# Patient Record
Sex: FEMALE | Race: WHITE | Employment: FULL TIME | URBAN - METROPOLITAN AREA
[De-identification: names, ages, dates, MRNs, and addresses within clinical notes are randomized per-mention and may not be internally consistent; named-entity substitution may affect disease eponyms.]

---

## 2019-06-15 ENCOUNTER — HOSPITAL ENCOUNTER (EMERGENCY)
Age: 49
Discharge: HOME | End: 2019-06-15
Attending: EMERGENCY MEDICINE
Payer: COMMERCIAL

## 2019-06-15 VITALS
SYSTOLIC BLOOD PRESSURE: 157 MMHG | RESPIRATION RATE: 16 BRPM | OXYGEN SATURATION: 96 % | HEIGHT: 69 IN | HEART RATE: 87 BPM | TEMPERATURE: 98.3 F | WEIGHT: 250 LBS | BODY MASS INDEX: 37.03 KG/M2 | DIASTOLIC BLOOD PRESSURE: 96 MMHG

## 2019-06-15 DIAGNOSIS — R53.1 GENERAL WEAKNESS: Primary | ICD-10-CM

## 2019-06-15 DIAGNOSIS — D75.1 POLYCYTHEMIA: ICD-10-CM

## 2019-06-15 DIAGNOSIS — N93.9 VAGINAL BLEEDING: ICD-10-CM

## 2019-06-15 LAB
ANION GAP SERPL CALCULATED.3IONS-SCNC: 12 MMOL/L (ref 7–16)
BACTERIA: ABNORMAL /HPF
BASOPHILS ABSOLUTE: 0.07 E9/L (ref 0–0.2)
BASOPHILS RELATIVE PERCENT: 1 % (ref 0–2)
BILIRUBIN URINE: NEGATIVE
BLOOD, URINE: ABNORMAL
BUN BLDV-MCNC: 14 MG/DL (ref 6–20)
CALCIUM SERPL-MCNC: 9.4 MG/DL (ref 8.6–10.2)
CHLORIDE BLD-SCNC: 99 MMOL/L (ref 98–107)
CLARITY: ABNORMAL
CO2: 25 MMOL/L (ref 22–29)
COLOR: ABNORMAL
CREAT SERPL-MCNC: 0.8 MG/DL (ref 0.5–1)
EKG ATRIAL RATE: 81 BPM
EKG P AXIS: 44 DEGREES
EKG P-R INTERVAL: 158 MS
EKG Q-T INTERVAL: 376 MS
EKG QRS DURATION: 102 MS
EKG QTC CALCULATION (BAZETT): 436 MS
EKG R AXIS: 61 DEGREES
EKG T AXIS: 36 DEGREES
EKG VENTRICULAR RATE: 81 BPM
EOSINOPHILS ABSOLUTE: 0.06 E9/L (ref 0.05–0.5)
EOSINOPHILS RELATIVE PERCENT: 0.9 % (ref 0–6)
EPITHELIAL CELLS, UA: ABNORMAL /HPF
GFR AFRICAN AMERICAN: >60
GFR NON-AFRICAN AMERICAN: >60 ML/MIN/1.73
GLUCOSE BLD-MCNC: 118 MG/DL (ref 74–99)
GLUCOSE URINE: NEGATIVE MG/DL
HCT VFR BLD CALC: 57.7 % (ref 34–48)
HEMOGLOBIN: 19.4 G/DL (ref 11.5–15.5)
IMMATURE GRANULOCYTES #: 0.02 E9/L
IMMATURE GRANULOCYTES %: 0.3 % (ref 0–5)
KETONES, URINE: NEGATIVE MG/DL
LEUKOCYTE ESTERASE, URINE: ABNORMAL
LYMPHOCYTES ABSOLUTE: 2.02 E9/L (ref 1.5–4)
LYMPHOCYTES RELATIVE PERCENT: 30.1 % (ref 20–42)
MCH RBC QN AUTO: 29.3 PG (ref 26–35)
MCHC RBC AUTO-ENTMCNC: 33.6 % (ref 32–34.5)
MCV RBC AUTO: 87.2 FL (ref 80–99.9)
MONOCYTES ABSOLUTE: 0.42 E9/L (ref 0.1–0.95)
MONOCYTES RELATIVE PERCENT: 6.3 % (ref 2–12)
NEUTROPHILS ABSOLUTE: 4.13 E9/L (ref 1.8–7.3)
NEUTROPHILS RELATIVE PERCENT: 61.4 % (ref 43–80)
NITRITE, URINE: NEGATIVE
PDW BLD-RTO: 13.3 FL (ref 11.5–15)
PH UA: 6 (ref 5–9)
PLATELET # BLD: 202 E9/L (ref 130–450)
PMV BLD AUTO: 10.3 FL (ref 7–12)
POTASSIUM REFLEX MAGNESIUM: 4.2 MMOL/L (ref 3.5–5)
PROTEIN UA: ABNORMAL MG/DL
RBC # BLD: 6.62 E12/L (ref 3.5–5.5)
RBC UA: >20 /HPF (ref 0–2)
SODIUM BLD-SCNC: 136 MMOL/L (ref 132–146)
SPECIFIC GRAVITY UA: 1.01 (ref 1–1.03)
TROPONIN: <0.01 NG/ML (ref 0–0.03)
UROBILINOGEN, URINE: 0.2 E.U./DL
WBC # BLD: 6.7 E9/L (ref 4.5–11.5)
WBC UA: ABNORMAL /HPF (ref 0–5)

## 2019-06-15 PROCEDURE — 81001 URINALYSIS AUTO W/SCOPE: CPT

## 2019-06-15 PROCEDURE — 93010 ELECTROCARDIOGRAM REPORT: CPT | Performed by: INTERNAL MEDICINE

## 2019-06-15 PROCEDURE — 84484 ASSAY OF TROPONIN QUANT: CPT

## 2019-06-15 PROCEDURE — 80048 BASIC METABOLIC PNL TOTAL CA: CPT

## 2019-06-15 PROCEDURE — 2580000003 HC RX 258: Performed by: STUDENT IN AN ORGANIZED HEALTH CARE EDUCATION/TRAINING PROGRAM

## 2019-06-15 PROCEDURE — 93005 ELECTROCARDIOGRAM TRACING: CPT | Performed by: STUDENT IN AN ORGANIZED HEALTH CARE EDUCATION/TRAINING PROGRAM

## 2019-06-15 PROCEDURE — 85025 COMPLETE CBC W/AUTO DIFF WBC: CPT

## 2019-06-15 PROCEDURE — 99283 EMERGENCY DEPT VISIT LOW MDM: CPT

## 2019-06-15 RX ORDER — DULOXETIN HYDROCHLORIDE 60 MG/1
60 CAPSULE, DELAYED RELEASE ORAL 2 TIMES DAILY
COMMUNITY

## 2019-06-15 RX ORDER — 0.9 % SODIUM CHLORIDE 0.9 %
1000 INTRAVENOUS SOLUTION INTRAVENOUS ONCE
Status: COMPLETED | OUTPATIENT
Start: 2019-06-15 | End: 2019-06-15

## 2019-06-15 RX ADMIN — SODIUM CHLORIDE 1000 ML: 9 INJECTION, SOLUTION INTRAVENOUS at 02:30

## 2019-06-15 SDOH — HEALTH STABILITY: MENTAL HEALTH: HOW OFTEN DO YOU HAVE A DRINK CONTAINING ALCOHOL?: NEVER

## 2019-06-15 ASSESSMENT — ENCOUNTER SYMPTOMS
SHORTNESS OF BREATH: 0
TROUBLE SWALLOWING: 0
VISUAL CHANGE: 1
ABDOMINAL PAIN: 0
NAUSEA: 0
BACK PAIN: 0
DIARRHEA: 0
COUGH: 0
VOMITING: 0

## 2019-06-15 NOTE — ED PROVIDER NOTES
ED ATTENDING NOTE    I saw and examined the patient. I discussed the history and examination with the resident and agree with the plan of care         HPI: Pt presents with heavy vaginal bleeding, x several days, had near syncope today, light headed, pt deneis f/c, ha, cp, sob, cough, ap, n/v/d. Pt is lying in bed in no acute distress. --------------------------------------------- PAST HISTORY ---------------------------------------------  Past Medical History:  has a past medical history of Depression. Past Surgical History:  has a past surgical history that includes Umbilical hernia repair. Social History:  reports that she has been smoking. She has been smoking about 0.50 packs per day. She has never used smokeless tobacco. She reports that she does not drink alcohol or use drugs. Family History: family history is not on file. The patients home medications have been reviewed. Allergies: Patient has no known allergies.     ROS: Review HPI for other details  Details in electronic record may supersede details below    Gen: no chills, fever  Eyes: no discharge, no change vision  ENT: no sore throat, no rhinitis  Cardiac: no chest pain, no palpitations  Resp: no sob, no cough  GI: no abd pain, no nausea, vomiting, or diarrhea  : no dysuria, urgency, change in color  MS: no back pain, joint pain, no falls, no trauma   Skin: no rash, no itch  Neuro: (+) dizziness, no headache, no focal paralysis or parasthesia  Psych: no hallucinations, no depression  Heme: no bruising, no bleeding  Other relevant systems reviewed and negative    Physical brief exam: See HPI for other details  Details in electronic record may supersede details below    Vital signs reviewed, please refer to nurses note  Constitutionall: No distress, warm, dry, well nourished, nontoxic  HENT:  NC AT, no deformity, no bleeding of gums  Eyes:  EOMI, nl lids and conjunctiva   Resp:  normal resp rate, no resp distress, no stridor  CVS: no JVD, no visible heave  GI:  no outward sign peritonitis or splinting, non distended  Musc-Skel:  full range of motion, normal appearing, no deformities/edema/ ecchymosis  Skin:  warm and dry, normal turgor, no rashes   Neurologic: awake and alert, cooperative, Cranial Nerves II-XII grossly intact, motor& sensory grossly intact x4   Psychiatric: orientated x3, answers questions appropriately, judgment & affect grossly appropriate  Heme/ Lymph: no visible adenopathy, ecchymosis, pettichiae    ------------------------ NURSING NOTES AND VITALS REVIEWED ---------------------------  Date / Time Roomed:  6/15/2019  1:13 AM  ED Bed Assignment:  22/22    The nursing notes within the ED encounter and vital signs as below have been reviewed. Patient Vitals for the past 24 hrs:   BP Temp Pulse Resp SpO2 Height Weight   06/15/19 0059 (!) 157/96 98.3 °F (36.8 °C) 87 20 96 % 5' 9.25\" (1.759 m) 250 lb (113.4 kg)       Oxygen Saturation Interpretation: Normal      Assessment and Plan: workup negative, pt is feeling better, pt to dc home and f/u with pcp, given strict return precautions for any new or worsening symptoms      Impression:   1. General weakness    2. Polycythemia    3. Vaginal bleeding            I have reviewed the patient's medications, vital signs, and diagnostic studies available to me in the medical record at the time of the patient encounter.         Orlando Gonzalez MD  06/29/19 2384

## 2019-06-15 NOTE — ED PROVIDER NOTES
The patient is a 38-year-old female who presents to the emergency department complaining of fatigue. The patient states she has had ongoing fatigue and weakness over the past 4 to 5 months. The patient states that today when she was at work she felt dizzy and lightheaded. Patient states that her vision became blurry, and she felt like she was going to pass out. The patient states she also has had increased vaginal bleeding with her menstrual periods over the past 3 months. Patient states she is currently on day 5 of her current menstrual cycle. The patient has not taken anything at home for her symptoms. Patient has not followed up with OB/GYN or family doctor in many years. Patient denies any fever, chills, cough, congestion, syncope, headache, neck pain, chest pain, shortness of breath, abdominal pain, nausea, vomiting, diarrhea, dysuria, hematuria, recent hospitalization, recent illness, change in medications, history of blood clots, or other acute symptoms or concerns. The history is provided by the patient. Fatigue   Severity:  Moderate  Onset quality:  Gradual  Timing:  Constant  Progression:  Worsening  Chronicity:  New  Context: not recent infection    Relieved by:  None tried  Worsened by:  Nothing  Ineffective treatments:  None tried  Associated symptoms: dizziness, near-syncope and vision change    Associated symptoms: no abdominal pain, no chest pain, no cough, no diarrhea, no difficulty walking, no dysuria, no numbness in extremities, no falls, no fever, no frequency, no headaches, no lethargy, no loss of consciousness, no nausea, no sensory-motor deficit, no shortness of breath, no syncope and no vomiting    Risk factors: excessive menstruation    Risk factors: no new medications and no recent stressors        Review of Systems   Constitutional: Positive for fatigue. Negative for chills and fever. HENT: Negative for congestion and trouble swallowing.     Respiratory: Negative for cough and shortness of breath. Cardiovascular: Positive for near-syncope. Negative for chest pain, palpitations, leg swelling and syncope. Gastrointestinal: Negative for abdominal pain, diarrhea, nausea and vomiting. Genitourinary: Positive for vaginal bleeding. Negative for dysuria, flank pain, frequency and hematuria. Musculoskeletal: Negative for back pain and falls. Skin: Negative for rash and wound. Neurological: Positive for dizziness and light-headedness. Negative for loss of consciousness, syncope, numbness and headaches. All other systems reviewed and are negative. Physical Exam   Constitutional: She is oriented to person, place, and time. Vital signs are normal. She appears well-developed and well-nourished. Non-toxic appearance. She does not have a sickly appearance. She does not appear ill. No distress. HENT:   Head: Normocephalic and atraumatic. Mouth/Throat: Mucous membranes are normal. Mucous membranes are not dry. Eyes: Conjunctivae, EOM and lids are normal.   Neck: Normal range of motion. Neck supple. Cardiovascular: Normal rate, regular rhythm, S1 normal, S2 normal and normal heart sounds. No murmur heard. Pulmonary/Chest: Effort normal and breath sounds normal. No respiratory distress. She has no decreased breath sounds. She has no wheezes. She has no rhonchi. She has no rales. Abdominal: Soft. Bowel sounds are normal. She exhibits no distension. There is no tenderness. There is no rigidity, no rebound and no guarding. Musculoskeletal: She exhibits no edema. Neurological: She is alert and oriented to person, place, and time. She displays no tremor. She exhibits normal muscle tone. Coordination normal.   Skin: Skin is warm and dry. Nursing note and vitals reviewed. Procedures    MDM  Number of Diagnoses or Management Options  General weakness:   Polycythemia:   Vaginal bleeding:   Diagnosis management comments:  The patient is a 49-year-old female presents to the emergency department complaining of fatigue and increased vaginal bleeding. Patient is hemodynamically stable, nontoxic-appearing, and in no acute distress. Labs evident of polycythemia with a hemoglobin of 19.4, hematocrit of 57.7, red blood cells of 6.62. No electrolyte abnormalities, no ST elevation evident on EKG, troponin negative, no evidence of urinary tract infection. Treated the patient with 1 L of IV fluids. Offered patient vaginal examination, however the patient declined and stated that she would rather just follow-up with OB/GYN. Provided patient with referral to OB/GYN, hematologist, and family doctor. Recommended patient to follow-up with his consultants this week. Also recommend her to stay hydrated and take a baby aspirin daily. The patient is to return to the ED if she experienced any significant worsening symptoms. The patient verbalized understanding and agreement to treatment plan and discharge instructions. EKG Interpretation. EKG: This EKG is signed and interpreted by me. Rate: 81  Rhythm: Sinus  Interpretation: Sinus rhythm, normal axis, incomplete right bundle branch block, no ST elevation, T wave flattening in lead III  Comparison: no previous EKG available            --------------------------------------------- PAST HISTORY ---------------------------------------------  Past Medical History:  has a past medical history of Depression. Past Surgical History:  has a past surgical history that includes Umbilical hernia repair. Social History:  reports that she has been smoking. She has been smoking about 0.50 packs per day. She has never used smokeless tobacco. She reports that she does not drink alcohol or use drugs. Family History: family history is not on file. The patients home medications have been reviewed. Allergies: Patient has no known allergies.     -------------------------------------------------- RESULTS -------------------------------------------------  Labs:  Results for orders placed or performed during the hospital encounter of 06/15/19   CBC auto differential   Result Value Ref Range    WBC 6.7 4.5 - 11.5 E9/L    RBC 6.62 (H) 3.50 - 5.50 E12/L    Hemoglobin 19.4 (H) 11.5 - 15.5 g/dL    Hematocrit 57.7 (H) 34.0 - 48.0 %    MCV 87.2 80.0 - 99.9 fL    MCH 29.3 26.0 - 35.0 pg    MCHC 33.6 32.0 - 34.5 %    RDW 13.3 11.5 - 15.0 fL    Platelets 935 075 - 271 E9/L    MPV 10.3 7.0 - 12.0 fL    Neutrophils % 61.4 43.0 - 80.0 %    Immature Granulocytes % 0.3 0.0 - 5.0 %    Lymphocytes % 30.1 20.0 - 42.0 %    Monocytes % 6.3 2.0 - 12.0 %    Eosinophils % 0.9 0.0 - 6.0 %    Basophils % 1.0 0.0 - 2.0 %    Neutrophils # 4.13 1.80 - 7.30 E9/L    Immature Granulocytes # 0.02 E9/L    Lymphocytes # 2.02 1.50 - 4.00 E9/L    Monocytes # 0.42 0.10 - 0.95 E9/L    Eosinophils # 0.06 0.05 - 0.50 E9/L    Basophils # 0.07 0.00 - 0.20 P4/Z   Basic Metabolic Panel w/ Reflex to MG   Result Value Ref Range    Sodium 136 132 - 146 mmol/L    Potassium reflex Magnesium 4.2 3.5 - 5.0 mmol/L    Chloride 99 98 - 107 mmol/L    CO2 25 22 - 29 mmol/L    Anion Gap 12 7 - 16 mmol/L    Glucose 118 (H) 74 - 99 mg/dL    BUN 14 6 - 20 mg/dL    CREATININE 0.8 0.5 - 1.0 mg/dL    GFR Non-African American >60 >=60 mL/min/1.73    GFR African American >60     Calcium 9.4 8.6 - 10.2 mg/dL   Troponin   Result Value Ref Range    Troponin <0.01 0.00 - 0.03 ng/mL   Urinalysis with Microscopic   Result Value Ref Range    Color, UA BLOODY Straw/Yellow    Clarity, UA SL CLOUDY Clear    Glucose, Ur Negative Negative mg/dL    Bilirubin Urine Negative Negative    Ketones, Urine Negative Negative mg/dL    Specific Gravity, UA 1.010 1.005 - 1.030    Blood, Urine LARGE (A) Negative    pH, UA 6.0 5.0 - 9.0    Protein, UA TRACE Negative mg/dL    Urobilinogen, Urine 0.2 <2.0 E.U./dL    Nitrite, Urine Negative Negative    Leukocyte Esterase, Urine TRACE (A) Negative    WBC, UA 2-5 0 - 5 /HPF    RBC, UA >20 0 - 2 /HPF    Epi Cells MODERATE /HPF    Bacteria, UA NONE /HPF   EKG 12 Lead   Result Value Ref Range    Ventricular Rate 81 BPM    Atrial Rate 81 BPM    P-R Interval 158 ms    QRS Duration 102 ms    Q-T Interval 376 ms    QTc Calculation (Bazett) 436 ms    P Axis 44 degrees    R Axis 61 degrees    T Axis 36 degrees       Radiology:  No orders to display       ------------------------- NURSING NOTES AND VITALS REVIEWED ---------------------------  Date / Time Roomed:  6/15/2019  1:13 AM  ED Bed Assignment:  22/22    The nursing notes within the ED encounter and vital signs as below have been reviewed. BP (!) 157/96   Pulse 87   Temp 98.3 °F (36.8 °C)   Resp 16   Ht 5' 9.25\" (1.759 m)   Wt 250 lb (113.4 kg)   LMP 06/12/2019   SpO2 96%   BMI 36.65 kg/m²   Oxygen Saturation Interpretation: Normal      ------------------------------------------ PROGRESS NOTES ------------------------------------------  4:02 AM  I have spoken with the patient and discussed todays results, in addition to providing specific details for the plan of care and counseling regarding the diagnosis and prognosis. Their questions are answered at this time and they are agreeable with the plan. I discussed at length with them reasons for immediate return here for re evaluation. They will followup with their primary care physician by calling their office on Monday.      --------------------------------- ADDITIONAL PROVIDER NOTES ---------------------------------  At this time the patient is without objective evidence of an acute process requiring hospitalization or inpatient management. They have remained hemodynamically stable throughout their entire ED visit and are stable for discharge with outpatient follow-up. The plan has been discussed in detail and they are aware of the specific conditions for emergent return, as well as the importance of follow-up.       New Prescriptions    No medications on file       Diagnosis:  1. General weakness    2. Polycythemia    3. Vaginal bleeding        Disposition:  Patient's disposition: Discharge to home  Patient's condition is stable.            Freida Severe, DO  Resident  06/15/19 2255

## 2021-06-14 ENCOUNTER — OFFICE VISIT (OUTPATIENT)
Dept: PRIMARY CARE CLINIC | Age: 51
End: 2021-06-14
Payer: COMMERCIAL

## 2021-06-14 VITALS
OXYGEN SATURATION: 97 % | RESPIRATION RATE: 14 BRPM | TEMPERATURE: 97.8 F | HEIGHT: 69 IN | DIASTOLIC BLOOD PRESSURE: 80 MMHG | BODY MASS INDEX: 39.99 KG/M2 | SYSTOLIC BLOOD PRESSURE: 142 MMHG | HEART RATE: 115 BPM | WEIGHT: 270 LBS

## 2021-06-14 DIAGNOSIS — D75.1 POLYCYTHEMIA: ICD-10-CM

## 2021-06-14 DIAGNOSIS — R03.0 ELEVATED BLOOD PRESSURE READING: Primary | ICD-10-CM

## 2021-06-14 PROCEDURE — 99203 OFFICE O/P NEW LOW 30 MIN: CPT | Performed by: FAMILY MEDICINE

## 2021-06-14 RX ORDER — DEXTROAMPHETAMINE SACCHARATE, AMPHETAMINE ASPARTATE MONOHYDRATE, DEXTROAMPHETAMINE SULFATE AND AMPHETAMINE SULFATE 7.5; 7.5; 7.5; 7.5 MG/1; MG/1; MG/1; MG/1
30 CAPSULE, EXTENDED RELEASE ORAL DAILY
COMMUNITY

## 2021-06-14 SDOH — ECONOMIC STABILITY: FOOD INSECURITY: WITHIN THE PAST 12 MONTHS, YOU WORRIED THAT YOUR FOOD WOULD RUN OUT BEFORE YOU GOT MONEY TO BUY MORE.: NEVER TRUE

## 2021-06-14 SDOH — ECONOMIC STABILITY: FOOD INSECURITY: WITHIN THE PAST 12 MONTHS, THE FOOD YOU BOUGHT JUST DIDN'T LAST AND YOU DIDN'T HAVE MONEY TO GET MORE.: NEVER TRUE

## 2021-06-14 ASSESSMENT — ENCOUNTER SYMPTOMS
COUGH: 0
SHORTNESS OF BREATH: 0
DIARRHEA: 0
NAUSEA: 0
ABDOMINAL PAIN: 0
CONSTIPATION: 0

## 2021-06-14 ASSESSMENT — SOCIAL DETERMINANTS OF HEALTH (SDOH): HOW HARD IS IT FOR YOU TO PAY FOR THE VERY BASICS LIKE FOOD, HOUSING, MEDICAL CARE, AND HEATING?: NOT HARD AT ALL

## 2021-06-14 ASSESSMENT — PATIENT HEALTH QUESTIONNAIRE - PHQ9
SUM OF ALL RESPONSES TO PHQ QUESTIONS 1-9: 0
1. LITTLE INTEREST OR PLEASURE IN DOING THINGS: 0
2. FEELING DOWN, DEPRESSED OR HOPELESS: 0
SUM OF ALL RESPONSES TO PHQ9 QUESTIONS 1 & 2: 0
SUM OF ALL RESPONSES TO PHQ QUESTIONS 1-9: 0
SUM OF ALL RESPONSES TO PHQ QUESTIONS 1-9: 0

## 2021-06-14 NOTE — PROGRESS NOTES
Stacey Llanos, a female of 44 y.o. came to the office 6/14/2021. There is no problem list on file for this patient. HPI psych: going to Dr. Kathy Mayberry for her care and meds. Skin: recently had melanoma surgery. Heme: elevated hemoglobin 2 yrs and never did anything about it. Bp: occas elevation in past.     Gen: 60-70 wt gain over past 5-6 yrs. Drinks 4-6 bottles of Diet Coke 20 oz ones. No Known Allergies    Current Outpatient Medications on File Prior to Visit   Medication Sig Dispense Refill    amphetamine-dextroamphetamine (ADDERALL XR) 30 MG extended release capsule Take 30 mg by mouth daily.  Multiple Vitamin (MVI, CELEBRATE, CHEWABLE TABLET) Take 1 tablet by mouth daily      DULoxetine (CYMBALTA) 60 MG extended release capsule Take 60 mg by mouth 2 times daily      aspirin 81 MG tablet Take 81 mg by mouth daily       No current facility-administered medications on file prior to visit. Review of Systems   Constitutional: Negative for fatigue and fever. Respiratory: Negative for cough and shortness of breath. Cardiovascular: Negative for chest pain and palpitations. Gastrointestinal: Negative for abdominal pain, constipation, diarrhea and nausea. Endocrine: Negative for polydipsia and polyuria. Genitourinary: Negative for difficulty urinating. Musculoskeletal: Negative for arthralgias and myalgias. Neurological: Negative for headaches. Psychiatric/Behavioral: Negative for dysphoric mood. The patient is nervous/anxious. other review of systems reviewed and are negative    OBJECTIVE:  BP (!) 142/80 (Site: Right Upper Arm, Position: Sitting, Cuff Size: Large Adult)   Pulse 115   Temp 97.8 °F (36.6 °C)   Resp 14   Ht 5' 9\" (1.753 m)   Wt 270 lb (122.5 kg)   SpO2 97%   BMI 39.87 kg/m²      Physical Exam  Vitals reviewed. Eyes:      General: No scleral icterus. Conjunctiva/sclera: Conjunctivae normal.   Neck:      Thyroid: No thyromegaly. Vascular: No carotid bruit. Cardiovascular:      Rate and Rhythm: Normal rate and regular rhythm. Heart sounds: No murmur heard. Pulmonary:      Effort: Pulmonary effort is normal.      Breath sounds: Normal breath sounds. No wheezing or rales. Abdominal:      General: Bowel sounds are normal.      Palpations: Abdomen is soft. There is no mass. Tenderness: There is no abdominal tenderness. There is no guarding or rebound. Musculoskeletal:         General: Normal range of motion. Cervical back: Neck supple. Lymphadenopathy:      Cervical: No cervical adenopathy. Skin:     General: Skin is warm and dry. Neurological:      Mental Status: She is alert and oriented to person, place, and time. Psychiatric:         Mood and Affect: Mood normal.         ASSESSMENT AND PLAN:    Nathan Smith was seen today for new patient, establish care and melanoma. Diagnoses and all orders for this visit:    Elevated blood pressure reading  -     Lipid Panel; Future  -     Comprehensive Metabolic Panel; Future    Polycythemia  -     CBC Auto Differential; Future    - quit smoking  - recommend mammo and colonoscopy. She'll let me know. - encourage diet and wt loss. - check bp's at home and if > 140/90 on regular basis then follow     Return in about 3 months (around 9/14/2021), or if symptoms worsen or fail to improve, for based on lab results.     Misbah Liao, DO

## 2024-04-26 ENCOUNTER — HOSPITAL ENCOUNTER (EMERGENCY)
Age: 54
Discharge: HOME | End: 2024-04-26
Attending: EMERGENCY MEDICINE
Payer: COMMERCIAL

## 2024-04-26 VITALS
HEART RATE: 73 BPM | RESPIRATION RATE: 16 BRPM | HEIGHT: 69 IN | OXYGEN SATURATION: 96 % | SYSTOLIC BLOOD PRESSURE: 149 MMHG | WEIGHT: 255 LBS | BODY MASS INDEX: 37.77 KG/M2 | DIASTOLIC BLOOD PRESSURE: 99 MMHG | TEMPERATURE: 98.8 F

## 2024-04-26 DIAGNOSIS — F90.9 ATTENTION DEFICIT HYPERACTIVITY DISORDER (ADHD), UNSPECIFIED ADHD TYPE: Primary | ICD-10-CM

## 2024-04-26 PROCEDURE — 99283 EMERGENCY DEPT VISIT LOW MDM: CPT

## 2024-04-26 RX ORDER — ATENOLOL 50 MG/1
50 TABLET ORAL DAILY
COMMUNITY

## 2024-04-26 RX ORDER — DEXTROAMPHETAMINE SACCHARATE, AMPHETAMINE ASPARTATE, DEXTROAMPHETAMINE SULFATE AND AMPHETAMINE SULFATE 7.5; 7.5; 7.5; 7.5 MG/1; MG/1; MG/1; MG/1
30 TABLET ORAL 2 TIMES DAILY
Qty: 60 TABLET | Refills: 0 | Status: SHIPPED | OUTPATIENT
Start: 2024-04-26 | End: 2024-05-26

## 2024-04-26 ASSESSMENT — PAIN - FUNCTIONAL ASSESSMENT: PAIN_FUNCTIONAL_ASSESSMENT: 0-10

## 2024-04-26 ASSESSMENT — PAIN SCALES - GENERAL: PAINLEVEL_OUTOF10: 0

## 2024-04-26 NOTE — ED PROVIDER NOTES
[facility] EMERGENCY DEPARTMENT  EMERGENCY DEPARTMENT ENCOUNTER        Pt Name: Linh Parrish  MRN: [id number]  Birthdate 1970  Date of evaluation: 4/26/2024  Provider: Missy Fuchs DO  PCP: Milo Liao DO  Note Started: 11:23 AM EDT 4/26/24    CHIEF COMPLAINT       Chief Complaint   Patient presents with    Med Refill Clerical     Needs medication refill. Doctor retired        HISTORY OF PRESENT ILLNESS: 1 or more Elements   History From: patient    Limitations to history : None    Linh Parrish is a 53 y.o. female who presents with need for medication refill of her Adderall for ADHD.  Patient states she has been on Adderall for years and she just found out her routine prescriber/provider is retiring and cannot get into a new provider until June 30.  She has been rationing out her current prescription for Adderall but should have run out 10 days ago.  She is anxious and hyperactive but denies SI, HI or hallucinations.  She was requesting an emergency refill until she can get to her new provider at [facility] on June 30.  The complaint has been persistent, moderate in severity, and worsened by nothing.              Nursing Notes were all reviewed and agreed with or any disagreements were addressed in the HPI.    REVIEW OF SYSTEMS :           Positives and Pertinent negatives as per HPI.     SURGICAL HISTORY     Past Surgical History:   Procedure Laterality Date    MALIGNANT SKIN LESION EXCISION  06/01/2021    [facility] Dr. Price.. also lymph node removal from left axilla.    UMBILICAL HERNIA REPAIR         CURRENTMEDICATIONS       Previous Medications    ASPIRIN 81 MG TABLET    Take 81 mg by mouth daily    ATENOLOL (TENORMIN) 50 MG TABLET    Take 1 tablet by mouth daily    DULOXETINE (CYMBALTA) 60 MG EXTENDED RELEASE CAPSULE    Take 1 capsule by mouth 2 times daily    MULTIPLE VITAMIN (MVI, CELEBRATE, CHEWABLE TABLET)    Take 1 tablet by mouth daily

## 2024-07-29 ENCOUNTER — OFFICE VISIT (OUTPATIENT)
Dept: PRIMARY CARE CLINIC | Age: 54
End: 2024-07-29
Payer: COMMERCIAL

## 2024-07-29 VITALS
DIASTOLIC BLOOD PRESSURE: 120 MMHG | TEMPERATURE: 97.2 F | HEART RATE: 108 BPM | SYSTOLIC BLOOD PRESSURE: 180 MMHG | RESPIRATION RATE: 16 BRPM | HEIGHT: 69 IN | BODY MASS INDEX: 35.84 KG/M2 | OXYGEN SATURATION: 98 % | WEIGHT: 242 LBS

## 2024-07-29 DIAGNOSIS — F41.1 GAD (GENERALIZED ANXIETY DISORDER): ICD-10-CM

## 2024-07-29 DIAGNOSIS — F98.8 ATTENTION DEFICIT DISORDER (ADD) WITHOUT HYPERACTIVITY: ICD-10-CM

## 2024-07-29 DIAGNOSIS — I10 PRIMARY HYPERTENSION: Primary | ICD-10-CM

## 2024-07-29 PROCEDURE — 99203 OFFICE O/P NEW LOW 30 MIN: CPT | Performed by: FAMILY MEDICINE

## 2024-07-29 PROCEDURE — 3080F DIAST BP >= 90 MM HG: CPT | Performed by: FAMILY MEDICINE

## 2024-07-29 PROCEDURE — 3077F SYST BP >= 140 MM HG: CPT | Performed by: FAMILY MEDICINE

## 2024-07-29 RX ORDER — ATENOLOL 50 MG/1
50 TABLET ORAL DAILY
Qty: 90 TABLET | Refills: 0 | Status: SHIPPED | OUTPATIENT
Start: 2024-07-29

## 2024-07-29 SDOH — ECONOMIC STABILITY: HOUSING INSECURITY
IN THE LAST 12 MONTHS, WAS THERE A TIME WHEN YOU DID NOT HAVE A STEADY PLACE TO SLEEP OR SLEPT IN A SHELTER (INCLUDING NOW)?: NO

## 2024-07-29 SDOH — ECONOMIC STABILITY: FOOD INSECURITY: WITHIN THE PAST 12 MONTHS, YOU WORRIED THAT YOUR FOOD WOULD RUN OUT BEFORE YOU GOT MONEY TO BUY MORE.: NEVER TRUE

## 2024-07-29 SDOH — ECONOMIC STABILITY: FOOD INSECURITY: WITHIN THE PAST 12 MONTHS, THE FOOD YOU BOUGHT JUST DIDN'T LAST AND YOU DIDN'T HAVE MONEY TO GET MORE.: NEVER TRUE

## 2024-07-29 SDOH — ECONOMIC STABILITY: INCOME INSECURITY: HOW HARD IS IT FOR YOU TO PAY FOR THE VERY BASICS LIKE FOOD, HOUSING, MEDICAL CARE, AND HEATING?: NOT HARD AT ALL

## 2024-07-29 ASSESSMENT — PATIENT HEALTH QUESTIONNAIRE - PHQ9
SUM OF ALL RESPONSES TO PHQ QUESTIONS 1-9: 0
1. LITTLE INTEREST OR PLEASURE IN DOING THINGS: NOT AT ALL
SUM OF ALL RESPONSES TO PHQ QUESTIONS 1-9: 0
SUM OF ALL RESPONSES TO PHQ9 QUESTIONS 1 & 2: 0
2. FEELING DOWN, DEPRESSED OR HOPELESS: NOT AT ALL
SUM OF ALL RESPONSES TO PHQ QUESTIONS 1-9: 0
SUM OF ALL RESPONSES TO PHQ QUESTIONS 1-9: 0

## 2024-07-29 ASSESSMENT — ENCOUNTER SYMPTOMS: SHORTNESS OF BREATH: 0

## 2024-07-29 NOTE — PROGRESS NOTES
Linh Parrish, a female of 53 y.o. came to the office 7/29/2024.     There is no problem list on file for this patient.         Anxiety  Presents for follow-up (was seeing Dr. Zavaleta for 15 yrs. on Adderall for Add and Cymbalta for depression.) visit. Patient reports no chest pain, palpitations or shortness of breath.       Hypertension  This is a chronic problem. The problem is uncontrolled. Associated symptoms include anxiety and headaches (when Vistaril given to her 2 days ago.). Pertinent negatives include no chest pain, palpitations, peripheral edema or shortness of breath.        No Known Allergies    Current Outpatient Medications on File Prior to Visit   Medication Sig Dispense Refill    amphetamine-dextroamphetamine (ADDERALL, 30MG,) 30 MG tablet Take 1 tablet by mouth 2 times daily for 30 days. Max Daily Amount: 60 mg 60 tablet 0    Multiple Vitamin (MVI, CELEBRATE, CHEWABLE TABLET) Take 1 tablet by mouth daily      DULoxetine (CYMBALTA) 60 MG extended release capsule Take 1 capsule by mouth 2 times daily       No current facility-administered medications on file prior to visit.       Review of Systems   Respiratory:  Negative for shortness of breath.    Cardiovascular:  Negative for chest pain and palpitations.   Neurological:  Positive for headaches (when Vistaril given to her 2 days ago.).     other review of systems reviewed and are negative    OBJECTIVE:  BP (!) 180/120 (Site: Right Upper Arm, Position: Sitting, Cuff Size: Medium Adult)   Pulse (!) 108   Temp 97.2 °F (36.2 °C)   Resp 16   Ht 1.753 m (5' 9\")   Wt 109.8 kg (242 lb)   SpO2 98%   BMI 35.74 kg/m²      Physical Exam  Vitals reviewed.   Eyes:      General: No scleral icterus.     Conjunctiva/sclera: Conjunctivae normal.   Neck:      Thyroid: No thyromegaly.      Vascular: No carotid bruit.   Cardiovascular:      Rate and Rhythm: Normal rate and regular rhythm.      Heart sounds: No murmur heard.  Pulmonary:      Effort: Pulmonary

## 2024-07-30 ENCOUNTER — TELEPHONE (OUTPATIENT)
Dept: PRIMARY CARE CLINIC | Age: 54
End: 2024-07-30

## 2024-07-30 NOTE — TELEPHONE ENCOUNTER
Pt called and states she has been vomiting since taking her meds. She stopped the two new onesthat she was just given and is only taking Cymbalta, Adderall and Altonin. She does not know what to do.    Please advise    Thank you

## 2024-08-04 ENCOUNTER — APPOINTMENT (OUTPATIENT)
Dept: GENERAL RADIOLOGY | Age: 54
End: 2024-08-04
Payer: COMMERCIAL

## 2024-08-04 ENCOUNTER — HOSPITAL ENCOUNTER (EMERGENCY)
Age: 54
Discharge: ANOTHER HOSPITAL | End: 2024-08-06
Attending: EMERGENCY MEDICINE
Payer: COMMERCIAL

## 2024-08-04 ENCOUNTER — APPOINTMENT (OUTPATIENT)
Dept: CT IMAGING | Age: 54
End: 2024-08-04
Payer: COMMERCIAL

## 2024-08-04 DIAGNOSIS — G93.89 BRAIN MASS: Primary | ICD-10-CM

## 2024-08-04 DIAGNOSIS — R11.2 NAUSEA AND VOMITING, UNSPECIFIED VOMITING TYPE: ICD-10-CM

## 2024-08-04 DIAGNOSIS — F17.210 CIGARETTE SMOKER: ICD-10-CM

## 2024-08-04 DIAGNOSIS — R51.9 NONINTRACTABLE HEADACHE, UNSPECIFIED CHRONICITY PATTERN, UNSPECIFIED HEADACHE TYPE: ICD-10-CM

## 2024-08-04 DIAGNOSIS — R91.8 PULMONARY NODULES: ICD-10-CM

## 2024-08-04 LAB
ALBUMIN SERPL-MCNC: 4.4 G/DL (ref 3.5–5.2)
ALP SERPL-CCNC: 75 U/L (ref 35–104)
ALT SERPL-CCNC: 12 U/L (ref 0–32)
ANION GAP SERPL CALCULATED.3IONS-SCNC: 12 MMOL/L (ref 7–16)
AST SERPL-CCNC: 26 U/L (ref 0–31)
BACTERIA URNS QL MICRO: ABNORMAL
BASOPHILS # BLD: 0.03 K/UL (ref 0–0.2)
BASOPHILS NFR BLD: 0 % (ref 0–2)
BILIRUB DIRECT SERPL-MCNC: 0.2 MG/DL (ref 0–0.3)
BILIRUB INDIRECT SERPL-MCNC: 0.9 MG/DL (ref 0–1)
BILIRUB SERPL-MCNC: 1.1 MG/DL (ref 0–1.2)
BILIRUB UR QL STRIP: ABNORMAL
BUN SERPL-MCNC: 15 MG/DL (ref 6–20)
CALCIUM SERPL-MCNC: 10.1 MG/DL (ref 8.6–10.2)
CHLORIDE SERPL-SCNC: 97 MMOL/L (ref 98–107)
CLARITY UR: CLEAR
CO2 SERPL-SCNC: 28 MMOL/L (ref 22–29)
COLOR UR: YELLOW
CREAT SERPL-MCNC: 0.8 MG/DL (ref 0.5–1)
EOSINOPHIL # BLD: 0.01 K/UL (ref 0.05–0.5)
EOSINOPHILS RELATIVE PERCENT: 0 % (ref 0–6)
EPI CELLS #/AREA URNS HPF: ABNORMAL /HPF
ERYTHROCYTE [DISTWIDTH] IN BLOOD BY AUTOMATED COUNT: 13.9 % (ref 11.5–15)
GFR, ESTIMATED: >90 ML/MIN/1.73M2
GLUCOSE SERPL-MCNC: 115 MG/DL (ref 74–99)
GLUCOSE UR STRIP-MCNC: NEGATIVE MG/DL
HCT VFR BLD AUTO: 60 % (ref 34–48)
HGB BLD-MCNC: 20.2 G/DL (ref 11.5–15.5)
HGB UR QL STRIP.AUTO: NEGATIVE
IMM GRANULOCYTES # BLD AUTO: 0.03 K/UL (ref 0–0.58)
IMM GRANULOCYTES NFR BLD: 0 % (ref 0–5)
KETONES UR STRIP-MCNC: 15 MG/DL
LEUKOCYTE ESTERASE UR QL STRIP: NEGATIVE
LIPASE SERPL-CCNC: 19 U/L (ref 13–60)
LYMPHOCYTES NFR BLD: 1.21 K/UL (ref 1.5–4)
LYMPHOCYTES RELATIVE PERCENT: 16 % (ref 20–42)
MAGNESIUM SERPL-MCNC: 2.1 MG/DL (ref 1.6–2.6)
MCH RBC QN AUTO: 29.9 PG (ref 26–35)
MCHC RBC AUTO-ENTMCNC: 33.7 G/DL (ref 32–34.5)
MCV RBC AUTO: 88.8 FL (ref 80–99.9)
MONOCYTES NFR BLD: 0.36 K/UL (ref 0.1–0.95)
MONOCYTES NFR BLD: 5 % (ref 2–12)
MUCOUS THREADS URNS QL MICRO: PRESENT
NEUTROPHILS NFR BLD: 78 % (ref 43–80)
NEUTS SEG NFR BLD: 5.83 K/UL (ref 1.8–7.3)
NITRITE UR QL STRIP: NEGATIVE
PH UR STRIP: 5.5 [PH] (ref 5–9)
PLATELET # BLD AUTO: 215 K/UL (ref 130–450)
PMV BLD AUTO: 10 FL (ref 7–12)
POTASSIUM SERPL-SCNC: 4.8 MMOL/L (ref 3.5–5)
PROT SERPL-MCNC: 7.7 G/DL (ref 6.4–8.3)
PROT UR STRIP-MCNC: ABNORMAL MG/DL
RBC # BLD AUTO: 6.76 M/UL (ref 3.5–5.5)
RBC #/AREA URNS HPF: ABNORMAL /HPF
SARS-COV-2 RDRP RESP QL NAA+PROBE: NOT DETECTED
SODIUM SERPL-SCNC: 137 MMOL/L (ref 132–146)
SP GR UR STRIP: >1.03 (ref 1–1.03)
SPECIMEN DESCRIPTION: NORMAL
TROPONIN I SERPL HS-MCNC: 9 NG/L (ref 0–9)
WBC #/AREA URNS HPF: ABNORMAL /HPF
WBC OTHER # BLD: 7.5 K/UL (ref 4.5–11.5)

## 2024-08-04 PROCEDURE — 85025 COMPLETE CBC W/AUTO DIFF WBC: CPT

## 2024-08-04 PROCEDURE — 96375 TX/PRO/DX INJ NEW DRUG ADDON: CPT

## 2024-08-04 PROCEDURE — 99285 EMERGENCY DEPT VISIT HI MDM: CPT

## 2024-08-04 PROCEDURE — 82248 BILIRUBIN DIRECT: CPT

## 2024-08-04 PROCEDURE — 6360000002 HC RX W HCPCS: Performed by: NURSE PRACTITIONER

## 2024-08-04 PROCEDURE — 70450 CT HEAD/BRAIN W/O DYE: CPT

## 2024-08-04 PROCEDURE — 83690 ASSAY OF LIPASE: CPT

## 2024-08-04 PROCEDURE — 87635 SARS-COV-2 COVID-19 AMP PRB: CPT

## 2024-08-04 PROCEDURE — 71046 X-RAY EXAM CHEST 2 VIEWS: CPT

## 2024-08-04 PROCEDURE — 80053 COMPREHEN METABOLIC PANEL: CPT

## 2024-08-04 PROCEDURE — 83735 ASSAY OF MAGNESIUM: CPT

## 2024-08-04 PROCEDURE — 84484 ASSAY OF TROPONIN QUANT: CPT

## 2024-08-04 PROCEDURE — 96374 THER/PROPH/DIAG INJ IV PUSH: CPT

## 2024-08-04 PROCEDURE — 81001 URINALYSIS AUTO W/SCOPE: CPT

## 2024-08-04 PROCEDURE — 93005 ELECTROCARDIOGRAM TRACING: CPT | Performed by: NURSE PRACTITIONER

## 2024-08-04 PROCEDURE — 6370000000 HC RX 637 (ALT 250 FOR IP): Performed by: NURSE PRACTITIONER

## 2024-08-04 PROCEDURE — 2580000003 HC RX 258: Performed by: NURSE PRACTITIONER

## 2024-08-04 RX ORDER — ACETAMINOPHEN 500 MG
1000 TABLET ORAL ONCE
Status: COMPLETED | OUTPATIENT
Start: 2024-08-04 | End: 2024-08-04

## 2024-08-04 RX ORDER — METOCLOPRAMIDE HYDROCHLORIDE 5 MG/ML
10 INJECTION INTRAMUSCULAR; INTRAVENOUS ONCE
Status: COMPLETED | OUTPATIENT
Start: 2024-08-04 | End: 2024-08-04

## 2024-08-04 RX ORDER — 0.9 % SODIUM CHLORIDE 0.9 %
1000 INTRAVENOUS SOLUTION INTRAVENOUS ONCE
Status: COMPLETED | OUTPATIENT
Start: 2024-08-04 | End: 2024-08-04

## 2024-08-04 RX ORDER — DEXAMETHASONE SODIUM PHOSPHATE 10 MG/ML
10 INJECTION INTRAMUSCULAR; INTRAVENOUS ONCE
Status: COMPLETED | OUTPATIENT
Start: 2024-08-04 | End: 2024-08-04

## 2024-08-04 RX ORDER — DIPHENHYDRAMINE HYDROCHLORIDE 50 MG/ML
50 INJECTION INTRAMUSCULAR; INTRAVENOUS ONCE
Status: DISCONTINUED | OUTPATIENT
Start: 2024-08-04 | End: 2024-08-04

## 2024-08-04 RX ORDER — DIPHENHYDRAMINE HYDROCHLORIDE 50 MG/ML
25 INJECTION INTRAMUSCULAR; INTRAVENOUS ONCE
Status: COMPLETED | OUTPATIENT
Start: 2024-08-04 | End: 2024-08-04

## 2024-08-04 RX ORDER — LEVETIRACETAM 500 MG/5ML
1000 INJECTION, SOLUTION, CONCENTRATE INTRAVENOUS ONCE
Status: COMPLETED | OUTPATIENT
Start: 2024-08-04 | End: 2024-08-04

## 2024-08-04 RX ADMIN — LEVETIRACETAM 1000 MG: 100 INJECTION INTRAVENOUS at 20:47

## 2024-08-04 RX ADMIN — ACETAMINOPHEN 1000 MG: 500 TABLET ORAL at 19:57

## 2024-08-04 RX ADMIN — DIPHENHYDRAMINE HYDROCHLORIDE 25 MG: 50 INJECTION INTRAMUSCULAR; INTRAVENOUS at 19:59

## 2024-08-04 RX ADMIN — DEXAMETHASONE SODIUM PHOSPHATE 10 MG: 10 INJECTION INTRAMUSCULAR; INTRAVENOUS at 20:47

## 2024-08-04 RX ADMIN — METOCLOPRAMIDE 10 MG: 5 INJECTION, SOLUTION INTRAMUSCULAR; INTRAVENOUS at 20:00

## 2024-08-04 RX ADMIN — SODIUM CHLORIDE 1000 ML: 9 INJECTION, SOLUTION INTRAVENOUS at 19:57

## 2024-08-04 ASSESSMENT — PAIN DESCRIPTION - DESCRIPTORS
DESCRIPTORS: ACHING
DESCRIPTORS: ACHING

## 2024-08-04 ASSESSMENT — PAIN DESCRIPTION - ORIENTATION
ORIENTATION: UPPER
ORIENTATION: UPPER

## 2024-08-04 ASSESSMENT — PAIN DESCRIPTION - LOCATION
LOCATION: HEAD

## 2024-08-04 ASSESSMENT — LIFESTYLE VARIABLES
HOW OFTEN DO YOU HAVE A DRINK CONTAINING ALCOHOL: NEVER
HOW MANY STANDARD DRINKS CONTAINING ALCOHOL DO YOU HAVE ON A TYPICAL DAY: PATIENT DOES NOT DRINK

## 2024-08-04 ASSESSMENT — PAIN SCALES - GENERAL
PAINLEVEL_OUTOF10: 7
PAINLEVEL_OUTOF10: 6
PAINLEVEL_OUTOF10: 7

## 2024-08-04 ASSESSMENT — PAIN - FUNCTIONAL ASSESSMENT
PAIN_FUNCTIONAL_ASSESSMENT: 0-10
PAIN_FUNCTIONAL_ASSESSMENT: 0-10

## 2024-08-04 NOTE — ED PROVIDER NOTES
of mild hydrocephalus from the fourth ventricular obstruction.  Additional edema seen from the medial right occipital lobe right parietal lobe with possible high density mass and patient's given history of melanoma it is possible that these lesions are metastatic melanoma but other considerations including breast, lung kidney, lymphoma thyroid producing slightly higher density metastatic lesions.  Patient reports that she did have her melanoma removed on her back 2 years ago at [facility] in [place] at that time she was also instructed to have biopsy of the lymph node axilla region which was enlarged and was found to be negative.     Patient was given given headache cocktail with Benadryl, Tylenol Reglan and normal saline bolus for their symptoms with moderate improvement of nausea headache.  She additionally was given Keppra and Decadron prophylactically for hydrocephalus and will order seizure precautions.  Case was discussed with neurosurgeon on-call and will transfer patient to intermediate bed at [facility] and have medicine follow.  Discussed patient with Dr. Mckeon who will accept patient at [facility].  Patient requires continued workup and management of their symptoms and will be admitted to the hospital for further evaluation and treatment.     ROS       History from : Patient    Limitations to history : None    Discussion with Other Professionals : None    Social Determinants Significantly Affecting Health : None      Records Reviewed : Other epic       Plan of Care/Counseling:  TIGRE Monteiro CNP and EM Attending Physician reviewed today's visit with the patient in addition to providing specific details for the plan of care and counseling regarding the diagnosis and prognosis.  Questions are answered at this time and are agreeable with the plan.      Assessment      1. Brain mass    2. Nonintractable headache, unspecified chronicity pattern, unspecified headache type    3.

## 2024-08-05 LAB
EKG ATRIAL RATE: 70 BPM
EKG P AXIS: 46 DEGREES
EKG P-R INTERVAL: 144 MS
EKG Q-T INTERVAL: 392 MS
EKG QRS DURATION: 90 MS
EKG QTC CALCULATION (BAZETT): 423 MS
EKG R AXIS: 55 DEGREES
EKG T AXIS: 45 DEGREES
EKG VENTRICULAR RATE: 70 BPM

## 2024-08-05 PROCEDURE — 93010 ELECTROCARDIOGRAM REPORT: CPT | Performed by: INTERNAL MEDICINE

## 2024-08-05 PROCEDURE — 6370000000 HC RX 637 (ALT 250 FOR IP): Performed by: STUDENT IN AN ORGANIZED HEALTH CARE EDUCATION/TRAINING PROGRAM

## 2024-08-05 PROCEDURE — 6360000002 HC RX W HCPCS: Performed by: EMERGENCY MEDICINE

## 2024-08-05 PROCEDURE — 96375 TX/PRO/DX INJ NEW DRUG ADDON: CPT

## 2024-08-05 PROCEDURE — 96376 TX/PRO/DX INJ SAME DRUG ADON: CPT

## 2024-08-05 RX ORDER — LEVETIRACETAM 500 MG/5ML
500 INJECTION, SOLUTION, CONCENTRATE INTRAVENOUS EVERY 12 HOURS
Status: DISCONTINUED | OUTPATIENT
Start: 2024-08-05 | End: 2024-08-06 | Stop reason: HOSPADM

## 2024-08-05 RX ORDER — PROCHLORPERAZINE EDISYLATE 5 MG/ML
10 INJECTION INTRAMUSCULAR; INTRAVENOUS ONCE
Status: COMPLETED | OUTPATIENT
Start: 2024-08-05 | End: 2024-08-05

## 2024-08-05 RX ORDER — ACETAMINOPHEN 325 MG/1
650 TABLET ORAL ONCE
Status: COMPLETED | OUTPATIENT
Start: 2024-08-05 | End: 2024-08-05

## 2024-08-05 RX ADMIN — LEVETIRACETAM 500 MG: 100 INJECTION INTRAVENOUS at 08:54

## 2024-08-05 RX ADMIN — ACETAMINOPHEN 650 MG: 325 TABLET ORAL at 14:20

## 2024-08-05 RX ADMIN — LEVETIRACETAM 500 MG: 100 INJECTION INTRAVENOUS at 21:33

## 2024-08-05 RX ADMIN — PROCHLORPERAZINE EDISYLATE 10 MG: 5 INJECTION INTRAMUSCULAR; INTRAVENOUS at 04:15

## 2024-08-05 ASSESSMENT — PAIN DESCRIPTION - ONSET: ONSET: ON-GOING

## 2024-08-05 ASSESSMENT — PAIN DESCRIPTION - LOCATION
LOCATION: HEAD
LOCATION: HEAD

## 2024-08-05 ASSESSMENT — PAIN SCALES - GENERAL
PAINLEVEL_OUTOF10: 6
PAINLEVEL_OUTOF10: 6

## 2024-08-05 ASSESSMENT — PAIN DESCRIPTION - PAIN TYPE: TYPE: ACUTE PAIN

## 2024-08-05 ASSESSMENT — PAIN - FUNCTIONAL ASSESSMENT
PAIN_FUNCTIONAL_ASSESSMENT: ACTIVITIES ARE NOT PREVENTED
PAIN_FUNCTIONAL_ASSESSMENT: ACTIVITIES ARE NOT PREVENTED

## 2024-08-05 ASSESSMENT — PAIN DESCRIPTION - FREQUENCY: FREQUENCY: CONTINUOUS

## 2024-08-05 ASSESSMENT — PAIN DESCRIPTION - DESCRIPTORS
DESCRIPTORS: SHOOTING;PRESSURE
DESCRIPTORS: ACHING;DISCOMFORT

## 2024-08-05 ASSESSMENT — PAIN DESCRIPTION - ORIENTATION
ORIENTATION: UPPER
ORIENTATION: OTHER (COMMENT)

## 2024-08-05 NOTE — ED NOTES
Pt states she has a blood clotting disorder and is supposed to be taking a baby aspirin daily. Pt does not take it, was prescribed 30 years ago and she does not take it on a regular basis.

## 2024-08-05 NOTE — PROGRESS NOTES
2017 called access center for Neuro surgery at [facility] for brain mass  2024 Dr. Hyman called and spoke with Sophia Ghotra at this time.  2029 Dr. Mckeon is the accepting physician at [facility]

## 2024-08-05 NOTE — ED NOTES
Updated patients sister Chacha. Pt is okay with giving any updates to Chacha and her brother Bradley

## 2024-08-06 ENCOUNTER — HOSPITAL ENCOUNTER (INPATIENT)
Age: 54
LOS: 4 days | Discharge: HOME | DRG: 080 | End: 2024-08-10
Attending: INTERNAL MEDICINE | Admitting: INTERNAL MEDICINE
Payer: COMMERCIAL

## 2024-08-06 VITALS
OXYGEN SATURATION: 99 % | HEIGHT: 69 IN | SYSTOLIC BLOOD PRESSURE: 136 MMHG | HEART RATE: 77 BPM | DIASTOLIC BLOOD PRESSURE: 77 MMHG | TEMPERATURE: 98 F | WEIGHT: 242 LBS | RESPIRATION RATE: 18 BRPM | BODY MASS INDEX: 35.84 KG/M2

## 2024-08-06 PROBLEM — G93.89 BRAIN MASS: Status: ACTIVE | Noted: 2024-08-06

## 2024-08-06 PROCEDURE — 2580000003 HC RX 258: Performed by: INTERNAL MEDICINE

## 2024-08-06 PROCEDURE — 6370000000 HC RX 637 (ALT 250 FOR IP)

## 2024-08-06 PROCEDURE — 96376 TX/PRO/DX INJ SAME DRUG ADON: CPT

## 2024-08-06 PROCEDURE — 2060000000 HC ICU INTERMEDIATE R&B

## 2024-08-06 PROCEDURE — 6370000000 HC RX 637 (ALT 250 FOR IP): Performed by: INTERNAL MEDICINE

## 2024-08-06 PROCEDURE — 6360000002 HC RX W HCPCS: Performed by: EMERGENCY MEDICINE

## 2024-08-06 RX ORDER — SODIUM CHLORIDE 0.9 % (FLUSH) 0.9 %
5-40 SYRINGE (ML) INJECTION PRN
Status: DISCONTINUED | OUTPATIENT
Start: 2024-08-06 | End: 2024-08-10 | Stop reason: HOSPADM

## 2024-08-06 RX ORDER — ONDANSETRON 4 MG/1
4 TABLET, ORALLY DISINTEGRATING ORAL EVERY 8 HOURS PRN
Status: DISCONTINUED | OUTPATIENT
Start: 2024-08-06 | End: 2024-08-10 | Stop reason: HOSPADM

## 2024-08-06 RX ORDER — POLYETHYLENE GLYCOL 3350 17 G/17G
17 POWDER, FOR SOLUTION ORAL DAILY PRN
Status: DISCONTINUED | OUTPATIENT
Start: 2024-08-06 | End: 2024-08-10 | Stop reason: HOSPADM

## 2024-08-06 RX ORDER — ATENOLOL 50 MG/1
50 TABLET ORAL DAILY
Status: DISCONTINUED | OUTPATIENT
Start: 2024-08-06 | End: 2024-08-10 | Stop reason: HOSPADM

## 2024-08-06 RX ORDER — DULOXETIN HYDROCHLORIDE 30 MG/1
60 CAPSULE, DELAYED RELEASE ORAL 2 TIMES DAILY
Status: DISCONTINUED | OUTPATIENT
Start: 2024-08-06 | End: 2024-08-07

## 2024-08-06 RX ORDER — SODIUM CHLORIDE 0.9 % (FLUSH) 0.9 %
5-40 SYRINGE (ML) INJECTION EVERY 12 HOURS SCHEDULED
Status: DISCONTINUED | OUTPATIENT
Start: 2024-08-06 | End: 2024-08-10 | Stop reason: HOSPADM

## 2024-08-06 RX ORDER — ACETAMINOPHEN 325 MG/1
650 TABLET ORAL EVERY 6 HOURS PRN
Status: DISCONTINUED | OUTPATIENT
Start: 2024-08-06 | End: 2024-08-10 | Stop reason: HOSPADM

## 2024-08-06 RX ORDER — SODIUM CHLORIDE 9 MG/ML
INJECTION, SOLUTION INTRAVENOUS PRN
Status: DISCONTINUED | OUTPATIENT
Start: 2024-08-06 | End: 2024-08-10 | Stop reason: HOSPADM

## 2024-08-06 RX ORDER — MAGNESIUM SULFATE IN WATER 40 MG/ML
2000 INJECTION, SOLUTION INTRAVENOUS PRN
Status: DISCONTINUED | OUTPATIENT
Start: 2024-08-06 | End: 2024-08-10 | Stop reason: HOSPADM

## 2024-08-06 RX ORDER — POTASSIUM CHLORIDE 7.45 MG/ML
10 INJECTION INTRAVENOUS PRN
Status: DISCONTINUED | OUTPATIENT
Start: 2024-08-06 | End: 2024-08-10 | Stop reason: HOSPADM

## 2024-08-06 RX ORDER — ACETAMINOPHEN 325 MG/1
650 TABLET ORAL ONCE
Status: COMPLETED | OUTPATIENT
Start: 2024-08-06 | End: 2024-08-06

## 2024-08-06 RX ORDER — ONDANSETRON 2 MG/ML
4 INJECTION INTRAMUSCULAR; INTRAVENOUS EVERY 6 HOURS PRN
Status: DISCONTINUED | OUTPATIENT
Start: 2024-08-06 | End: 2024-08-10 | Stop reason: HOSPADM

## 2024-08-06 RX ORDER — POTASSIUM CHLORIDE 20 MEQ/1
40 TABLET, EXTENDED RELEASE ORAL PRN
Status: DISCONTINUED | OUTPATIENT
Start: 2024-08-06 | End: 2024-08-10 | Stop reason: HOSPADM

## 2024-08-06 RX ORDER — ACETAMINOPHEN 650 MG/1
650 SUPPOSITORY RECTAL EVERY 6 HOURS PRN
Status: DISCONTINUED | OUTPATIENT
Start: 2024-08-06 | End: 2024-08-10 | Stop reason: HOSPADM

## 2024-08-06 RX ADMIN — LEVETIRACETAM 500 MG: 100 INJECTION INTRAVENOUS at 08:59

## 2024-08-06 RX ADMIN — ACETAMINOPHEN 650 MG: 325 TABLET ORAL at 08:59

## 2024-08-06 RX ADMIN — SODIUM CHLORIDE, PRESERVATIVE FREE 10 ML: 5 INJECTION INTRAVENOUS at 19:57

## 2024-08-06 RX ADMIN — DULOXETINE HYDROCHLORIDE 60 MG: 30 CAPSULE, DELAYED RELEASE ORAL at 19:57

## 2024-08-06 RX ADMIN — ATENOLOL 50 MG: 50 TABLET ORAL at 19:57

## 2024-08-06 ASSESSMENT — PAIN SCALES - GENERAL
PAINLEVEL_OUTOF10: 0
PAINLEVEL_OUTOF10: 0
PAINLEVEL_OUTOF10: 6

## 2024-08-06 NOTE — PROGRESS NOTES
4 Eyes Skin Assessment     NAME:  Linh Parrish  YOB: 1970  MEDICAL RECORD NUMBER:  [id number]    The patient is being assessed for  Admission    I agree that at least one RN has performed a thorough Head to Toe Skin Assessment on the patient. ALL assessment sites listed below have been assessed.      Areas assessed by both nurses:    Head, Face, Ears, Shoulders, Back, Chest, Arms, Elbows, Hands, Sacrum. Buttock, Coccyx, Ischium, Legs. Feet and Heels, and Under Medical Devices         Does the Patient have a Wound? No noted wound(s)       Jerald Prevention initiated by RN: no  Wound Care Orders initiated by RN: No    Pressure Injury (Stage 3,4, Unstageable, DTI, NWPT, and Complex wounds) if present, place Wound referral order by RN under : No    New Ostomies, if present place, Ostomy referral order under : No     Nurse 1 eSignature: Electronically signed by Dean Benitez RN on 8/6/24 at 6:44 PM EDT    **SHARE this note so that the co-signing nurse can place an eSignature**    Nurse 2 eSignature: Electronically signed by Sofia Dasilva RN on 8/6/24 at 6:48 PM EDT

## 2024-08-06 NOTE — PROGRESS NOTES
@[address] [facility] center reported [place] [facility]  Bed 8507B  N2N [phone]

## 2024-08-07 ENCOUNTER — APPOINTMENT (OUTPATIENT)
Dept: MRI IMAGING | Age: 54
DRG: 080 | End: 2024-08-07
Attending: INTERNAL MEDICINE
Payer: COMMERCIAL

## 2024-08-07 ENCOUNTER — APPOINTMENT (OUTPATIENT)
Dept: CT IMAGING | Age: 54
DRG: 080 | End: 2024-08-07
Attending: NEUROLOGICAL SURGERY
Payer: COMMERCIAL

## 2024-08-07 PROBLEM — C79.31 METASTASIS TO BRAIN (HCC): Status: ACTIVE | Noted: 2024-08-07

## 2024-08-07 LAB
BASOPHILS # BLD: 0.02 K/UL (ref 0–0.2)
BASOPHILS NFR BLD: 0 % (ref 0–2)
EOSINOPHIL # BLD: 0 K/UL (ref 0.05–0.5)
EOSINOPHILS RELATIVE PERCENT: 0 % (ref 0–6)
ERYTHROCYTE [DISTWIDTH] IN BLOOD BY AUTOMATED COUNT: 13.2 % (ref 11.5–15)
HCT VFR BLD AUTO: 54.9 % (ref 34–48)
HGB BLD-MCNC: 18.4 G/DL (ref 11.5–15.5)
IMM GRANULOCYTES # BLD AUTO: 0.03 K/UL (ref 0–0.58)
IMM GRANULOCYTES NFR BLD: 1 % (ref 0–5)
LYMPHOCYTES NFR BLD: 0.63 K/UL (ref 1.5–4)
LYMPHOCYTES RELATIVE PERCENT: 11 % (ref 20–42)
MCH RBC QN AUTO: 29.4 PG (ref 26–35)
MCHC RBC AUTO-ENTMCNC: 33.5 G/DL (ref 32–34.5)
MCV RBC AUTO: 87.8 FL (ref 80–99.9)
MONOCYTES NFR BLD: 0.12 K/UL (ref 0.1–0.95)
MONOCYTES NFR BLD: 2 % (ref 2–12)
NEUTROPHILS NFR BLD: 86 % (ref 43–80)
NEUTS SEG NFR BLD: 5.03 K/UL (ref 1.8–7.3)
PLATELET # BLD AUTO: 179 K/UL (ref 130–450)
PMV BLD AUTO: 10.5 FL (ref 7–12)
RBC # BLD AUTO: 6.25 M/UL (ref 3.5–5.5)
WBC OTHER # BLD: 5.8 K/UL (ref 4.5–11.5)

## 2024-08-07 PROCEDURE — 6370000000 HC RX 637 (ALT 250 FOR IP): Performed by: NEUROLOGICAL SURGERY

## 2024-08-07 PROCEDURE — 2060000000 HC ICU INTERMEDIATE R&B

## 2024-08-07 PROCEDURE — 71270 CT THORAX DX C-/C+: CPT

## 2024-08-07 PROCEDURE — 6360000004 HC RX CONTRAST MEDICATION: Performed by: RADIOLOGY

## 2024-08-07 PROCEDURE — 99222 1ST HOSP IP/OBS MODERATE 55: CPT | Performed by: NEUROLOGICAL SURGERY

## 2024-08-07 PROCEDURE — 36415 COLL VENOUS BLD VENIPUNCTURE: CPT

## 2024-08-07 PROCEDURE — A9579 GAD-BASE MR CONTRAST NOS,1ML: HCPCS | Performed by: RADIOLOGY

## 2024-08-07 PROCEDURE — 6370000000 HC RX 637 (ALT 250 FOR IP): Performed by: INTERNAL MEDICINE

## 2024-08-07 PROCEDURE — 70553 MRI BRAIN STEM W/O & W/DYE: CPT

## 2024-08-07 PROCEDURE — 6360000002 HC RX W HCPCS: Performed by: NEUROLOGICAL SURGERY

## 2024-08-07 PROCEDURE — 2580000003 HC RX 258: Performed by: INTERNAL MEDICINE

## 2024-08-07 PROCEDURE — 85025 COMPLETE CBC W/AUTO DIFF WBC: CPT

## 2024-08-07 PROCEDURE — 74178 CT ABD&PLV WO CNTR FLWD CNTR: CPT

## 2024-08-07 PROCEDURE — 6360000002 HC RX W HCPCS: Performed by: INTERNAL MEDICINE

## 2024-08-07 RX ORDER — FAMOTIDINE 20 MG/1
20 TABLET, FILM COATED ORAL 2 TIMES DAILY
Status: DISCONTINUED | OUTPATIENT
Start: 2024-08-07 | End: 2024-08-10 | Stop reason: HOSPADM

## 2024-08-07 RX ORDER — DEXAMETHASONE SODIUM PHOSPHATE 4 MG/ML
4 INJECTION, SOLUTION INTRA-ARTICULAR; INTRALESIONAL; INTRAMUSCULAR; INTRAVENOUS; SOFT TISSUE EVERY 6 HOURS
Status: DISCONTINUED | OUTPATIENT
Start: 2024-08-07 | End: 2024-08-10 | Stop reason: HOSPADM

## 2024-08-07 RX ADMIN — ATENOLOL 50 MG: 50 TABLET ORAL at 10:22

## 2024-08-07 RX ADMIN — SODIUM CHLORIDE, PRESERVATIVE FREE 10 ML: 5 INJECTION INTRAVENOUS at 10:22

## 2024-08-07 RX ADMIN — DEXAMETHASONE SODIUM PHOSPHATE 4 MG: 4 INJECTION INTRA-ARTICULAR; INTRALESIONAL; INTRAMUSCULAR; INTRAVENOUS; SOFT TISSUE at 06:37

## 2024-08-07 RX ADMIN — DEXAMETHASONE SODIUM PHOSPHATE 4 MG: 4 INJECTION INTRA-ARTICULAR; INTRALESIONAL; INTRAMUSCULAR; INTRAVENOUS; SOFT TISSUE at 11:34

## 2024-08-07 RX ADMIN — ONDANSETRON 4 MG: 2 INJECTION INTRAMUSCULAR; INTRAVENOUS at 10:22

## 2024-08-07 RX ADMIN — IOPAMIDOL 75 ML: 755 INJECTION, SOLUTION INTRAVENOUS at 09:02

## 2024-08-07 RX ADMIN — ACETAMINOPHEN 650 MG: 325 TABLET ORAL at 00:47

## 2024-08-07 RX ADMIN — GADOTERIDOL 20 ML: 279.3 INJECTION, SOLUTION INTRAVENOUS at 09:32

## 2024-08-07 RX ADMIN — FAMOTIDINE 20 MG: 20 TABLET, FILM COATED ORAL at 20:12

## 2024-08-07 RX ADMIN — DEXAMETHASONE SODIUM PHOSPHATE 4 MG: 4 INJECTION INTRA-ARTICULAR; INTRALESIONAL; INTRAMUSCULAR; INTRAVENOUS; SOFT TISSUE at 23:43

## 2024-08-07 RX ADMIN — FAMOTIDINE 20 MG: 20 TABLET, FILM COATED ORAL at 10:21

## 2024-08-07 RX ADMIN — ACETAMINOPHEN 650 MG: 325 TABLET ORAL at 18:38

## 2024-08-07 RX ADMIN — ACETAMINOPHEN 650 MG: 325 TABLET ORAL at 10:21

## 2024-08-07 RX ADMIN — DEXAMETHASONE SODIUM PHOSPHATE 4 MG: 4 INJECTION INTRA-ARTICULAR; INTRALESIONAL; INTRAMUSCULAR; INTRAVENOUS; SOFT TISSUE at 18:38

## 2024-08-07 RX ADMIN — SODIUM CHLORIDE, PRESERVATIVE FREE 10 ML: 5 INJECTION INTRAVENOUS at 20:12

## 2024-08-07 ASSESSMENT — PAIN SCALES - GENERAL
PAINLEVEL_OUTOF10: 4
PAINLEVEL_OUTOF10: 8
PAINLEVEL_OUTOF10: 6
PAINLEVEL_OUTOF10: 6
PAINLEVEL_OUTOF10: 0
PAINLEVEL_OUTOF10: 2

## 2024-08-07 ASSESSMENT — PAIN DESCRIPTION - LOCATION
LOCATION: HEAD

## 2024-08-07 ASSESSMENT — PAIN DESCRIPTION - DESCRIPTORS
DESCRIPTORS: ACHING;DISCOMFORT;DULL
DESCRIPTORS: ACHING;CRUSHING;DISCOMFORT
DESCRIPTORS: ACHING;DULL;DISCOMFORT

## 2024-08-07 ASSESSMENT — PAIN - FUNCTIONAL ASSESSMENT: PAIN_FUNCTIONAL_ASSESSMENT: ACTIVITIES ARE NOT PREVENTED

## 2024-08-07 ASSESSMENT — PAIN DESCRIPTION - ORIENTATION: ORIENTATION: RIGHT;LEFT

## 2024-08-07 NOTE — CONSULTS
Blood and Cancer center  Hematology/Oncology  Consult      Patient Name: Linh Parrish  YOB: 1970  PCP: Milo Liao DO   Referring Provider: [address] [facility] [address] / [facility] [address]     Reason for Consultation: No chief complaint on file.       History of Present Illness: This is a 54-year-old female patient with a PMH of pT4b Melanoma >4.0 mm in thickness, with ulceration diagnosed in 2021. She is s/p wide local excision of left upper back melanoma (~5x5cm) with sentinel node removal. 3/3 LN were negative for malignancy. She was to follow up Oncology for a clinical trial, however unsure if she has done so. Patient presented to [facility] ED initially for evaluation of HA, nausea, and vomiting for about 1 week. CT head showed . Multiple areas of vasogenic edema are representative of metastatic disease. The most prominent region is in the right inferior cerebellum anteriorly, with edema extending into the right kehinde. This is associated with moderate compression of the 4th ventricle, moderately displaced towards the left. This is associated with mild bilateral lateral ventricular and 3rd ventricular dilatation, with dilatation of the temporal horns and mild subependymal flow of CSF. 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 Additional vasogenic edema is seen in the medial right occipital lobe, the right mid parietal lobe where there is a possible subtle slightly high density mass measuring 2.0 x 1.4 cm, and the medial posterior left occipital lobe where there is a possible heterogeneous mass measuring 1.3 x 1.3 cm. Coarse calcification in the posterior-lateral left inferior cerebellar hemisphere with a small amount of edema around it. Chest x-ray with pulmonary nodules. She was transferred to [facility] and Neurosurgery was consulted. CT chest/A/P and MRI brain have all been ordered and are pending. Further recommendations pending scans. She was started IV decadron.  N0 melanoma on left shoulder s/p wide local excision and sentinel lymph node dissection in 2021 with widespread brain lung and adrenal metastasis and a large abdominal pelvic mass.      Continue steroids.  Radiation oncology for wbrt.   IR consult for biopsy of large pelvic lesion.  Will continue to follow.  Thank you for the consult.      TIGRE Love - CNP  Electronically signed 8/7/2024 at 10:29 AM  Patient seen and examined.  Agree with CNP assessment plan.  Note updated.  Rubin Nunez MD

## 2024-08-07 NOTE — CONSULTS
[facility]              [address] [place] [address]                              CONSULTATION      PATIENT NAME: KIMBERLY GALE              : 1970  MED REC NO: [id number]                        ROOM: 8507  ACCOUNT NO: [id number]                       ADMIT DATE: 2024  PROVIDER: Ephraim Berg MD    NEUROSURGERY CONSULTATION    CONSULT DATE: 2024    REFERRING PHYSICIAN:  HARLEY UMANZOR      REASON FOR CONSULT:  Brain metastasis.    HISTORY OF PRESENT ILLNESS:  The patient is a 54-year-old lady who presented to [facility] with approximately 1-week history of headaches, nausea, and vomiting.  She subsequently had a CT scan of her head that did show multiple intracranial lesions.  She was transferred from [facility] to [facility] in [place] for further evaluation and management.  She describes the headaches as throbbing headache in the vertex region.  Denies any new numbness, tingling, or weakness or loss of control of bowel or bladder function.    PAST MEDICAL HISTORY:  Positive for ADHD, depression, generalized anxiety disorder, and melanoma.    PAST SURGICAL HISTORY:  Positive for umbilical hernia repair, melanoma excision in her upper back.    FAMILY HISTORY:  Unknown as she was adopted.    SOCIAL HISTORY:  Positive for tobacco use and she does not consume any alcoholic beverages.    ALLERGIES:  SHE HAS NO KNOWN DRUG ALLERGIES.      HOME MEDICATIONS:  Include Cymbalta.    REVIEW OF SYSTEMS:  HEENT:  Positive for headache, but negative for double vision or blurred vision.  CARDIOVASCULAR:  Negative for chest pain, arrhythmia, or palpitations.  RESPIRATORY:  Negative for shortness of breath, asthma, bronchitis, or pneumonia.  GASTROINTESTINAL:  Positive for nausea and vomiting.  GENITOURINARY:  Negative for hematuria or dysuria.  HEMATOLOGIC:  Negative for easy bleeding or bruising.  INFECTIOUS:

## 2024-08-07 NOTE — H&P
[facility]              [address] [id number]                           HISTORY & PHYSICAL      PATIENT NAME: KIMBERLY GALE              : 1970  MED REC NO: [id number]                        ROOM: 8507  ACCOUNT NO: [id number]                       ADMIT DATE: 2024  PROVIDER: Pavan Mckeon DO      REFERRING PHYSICIAN:  Fadi Carlisle DO    PRIMARY CARE PHYSICIAN:  MD Elza    CHIEF COMPLAINT:  Headache.    HISTORY OF PRESENT ILLNESS:  The patient is a 54-year-old  female who presented to the emergency room complaining of headache.  Diagnostic evaluation revealed abnormal CT scan of the brain with edema.  Chest x-ray with pulmonary nodules.  The patient was originally seen at [facility] Emergency Room and was transferred to [facility] for further evaluation.  The patient with history of malignant melanoma removed from back.    PAST MEDICAL HISTORY:  ADD; depression; hypertension; malignant melanoma, back.    PAST SURGICAL HISTORY:  Umbilical hernia repair, malignant melanoma lesion removal of back in , tonsillectomy, wisdom teeth extraction.    SOCIAL HISTORY:  The patient quit tobacco 5 days ago.  No alcohol.    MEDICATIONS:  Currently, atenolol.  She was previously on Adderall and Cymbalta, which she is not taking.    REVIEW OF SYSTEMS:  Remarkable for above-stated chief complaint.    ALLERGIES:  NONE KNOWN.      PHYSICAL EXAMINATION:  GENERAL APPEARANCE:  Reveals a 54-year-old  female who is alert and oriented x3, cooperative, and a good historian.  VITAL SIGNS:  On admission, temperature 98.2, pulse 70, respirations 17, blood pressure 126/91.  HEAD:  Normocephalic, atraumatic.  Eyes, pupils equal and reactive to light.  Extraocular muscles intact.  Fundi not well visualized.  NOSE:  No obstruction, polyp, or discharge noted.  MOUTH:  Mucosa without lesion.  PHARYNX:

## 2024-08-07 NOTE — CARE COORDINATION
Transfer from [place] - was there for headaches and emesis. Ct head- Multiple areas of vasogenic edema are representative of metastatic disease.  The patient has a history of melanoma. It is possible that these lesions are metastatic melanoma, but other considerations include breast, lung, kidney, lymphoma, and thyroid which produce slightly high-density metastatic lesions. Cxr - here are bilateral pulmonary nodules concerning for pulmonary metastases. Neurosurgey consult -will obtain an MRI of her brain with and without contrast, and we will also get a CT scan of her chest, abdomen and pelvis. and hem onc consults. Met with patient to discuss role/transition of care. She lives alone in [address] [facility].  Her PCP is Dr Liao and she uses [facility]. No HHC, [facility] or  DME. Her plan is home and she will call for a ride. Electronically signed by Giana Monte RN on 8/7/2024 at 11:57 AM    Gave patients Counts include 234 beds at the [facility] POA paper work.Electronically signed by Giana Monte RN on 8/7/2024 at 3:27 PM

## 2024-08-08 ENCOUNTER — HOSPITAL ENCOUNTER (OUTPATIENT)
Dept: RADIATION ONCOLOGY | Age: 54
Discharge: HOME | End: 2024-08-08

## 2024-08-08 LAB
INR PPP: 1.1
PROTHROMBIN TIME: 11.8 SEC (ref 9.3–12.4)

## 2024-08-08 PROCEDURE — 6370000000 HC RX 637 (ALT 250 FOR IP): Performed by: NEUROLOGICAL SURGERY

## 2024-08-08 PROCEDURE — 6370000000 HC RX 637 (ALT 250 FOR IP): Performed by: INTERNAL MEDICINE

## 2024-08-08 PROCEDURE — 2580000003 HC RX 258: Performed by: INTERNAL MEDICINE

## 2024-08-08 PROCEDURE — 6360000002 HC RX W HCPCS: Performed by: NEUROLOGICAL SURGERY

## 2024-08-08 PROCEDURE — 85610 PROTHROMBIN TIME: CPT

## 2024-08-08 PROCEDURE — 99232 SBSQ HOSP IP/OBS MODERATE 35: CPT | Performed by: NEUROLOGICAL SURGERY

## 2024-08-08 PROCEDURE — 36415 COLL VENOUS BLD VENIPUNCTURE: CPT

## 2024-08-08 PROCEDURE — 2060000000 HC ICU INTERMEDIATE R&B

## 2024-08-08 RX ADMIN — SODIUM CHLORIDE, PRESERVATIVE FREE 10 ML: 5 INJECTION INTRAVENOUS at 20:38

## 2024-08-08 RX ADMIN — ACETAMINOPHEN 650 MG: 325 TABLET ORAL at 17:42

## 2024-08-08 RX ADMIN — ACETAMINOPHEN 650 MG: 325 TABLET ORAL at 05:11

## 2024-08-08 RX ADMIN — DEXAMETHASONE SODIUM PHOSPHATE 4 MG: 4 INJECTION INTRA-ARTICULAR; INTRALESIONAL; INTRAMUSCULAR; INTRAVENOUS; SOFT TISSUE at 13:05

## 2024-08-08 RX ADMIN — FAMOTIDINE 20 MG: 20 TABLET, FILM COATED ORAL at 20:37

## 2024-08-08 RX ADMIN — DEXAMETHASONE SODIUM PHOSPHATE 4 MG: 4 INJECTION INTRA-ARTICULAR; INTRALESIONAL; INTRAMUSCULAR; INTRAVENOUS; SOFT TISSUE at 05:11

## 2024-08-08 RX ADMIN — DEXAMETHASONE SODIUM PHOSPHATE 4 MG: 4 INJECTION INTRA-ARTICULAR; INTRALESIONAL; INTRAMUSCULAR; INTRAVENOUS; SOFT TISSUE at 18:37

## 2024-08-08 ASSESSMENT — PAIN DESCRIPTION - ORIENTATION: ORIENTATION: RIGHT;LEFT

## 2024-08-08 ASSESSMENT — PAIN DESCRIPTION - LOCATION: LOCATION: HEAD

## 2024-08-08 ASSESSMENT — PAIN DESCRIPTION - DESCRIPTORS: DESCRIPTORS: ACHING;CRUSHING

## 2024-08-08 ASSESSMENT — PAIN SCALES - GENERAL
PAINLEVEL_OUTOF10: 5
PAINLEVEL_OUTOF10: 9

## 2024-08-08 NOTE — PROGRESS NOTES
Blood and Cancer center  Hematology/Oncology  Consult      Patient Name: Linh Parrish  YOB: 1970  PCP: Milo Liao DO   Referring Provider: [address] [facility] [address] / [facility] [address]     Reason for Consultation: No chief complaint on file.       History of Present Illness: This is a 54-year-old female patient with a PMH of pT4b Melanoma >4.0 mm in thickness, with ulceration diagnosed in 2021. She is s/p wide local excision of left upper back melanoma (~5x5cm) with sentinel node removal. 3/3 LN were negative for malignancy. She was to follow up Oncology for a clinical trial, however unsure if she has done so. Patient presented to [facility] ED initially for evaluation of HA, nausea, and vomiting for about 1 week. CT head showed . Multiple areas of vasogenic edema are representative of metastatic disease. The most prominent region is in the right inferior cerebellum anteriorly, with edema extending into the right kehinde. This is associated with moderate compression of the 4th ventricle, moderately displaced towards the left. This is associated with mild bilateral lateral ventricular and 3rd ventricular dilatation, with dilatation of the temporal horns and mild subependymal flow of CSF. 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 Additional vasogenic edema is seen in the medial right occipital lobe, the right mid parietal lobe where there is a possible subtle slightly high density mass measuring 2.0 x 1.4 cm, and the medial posterior left occipital lobe where there is a possible heterogeneous mass measuring 1.3 x 1.3 cm. Coarse calcification in the posterior-lateral left inferior cerebellar hemisphere with a small amount of edema around it. Chest x-ray with pulmonary nodules. She was transferred to [facility] and Neurosurgery was consulted. CT chest/A/P and MRI brain have all been ordered and are pending. Further recommendations pending scans. She was started IV decadron.  lesions are metastatic melanoma, but other considerations include breast, lung, kidney, lymphoma, and thyroid which produce slightly high-density metastatic lesions.         ASSESSMENT/PLAN :  54-year-old female   - pT4b Melanoma >4.0 mm in thickness, with ulceration diagnosed in 2021. She is s/p wide local excision of left upper back melanoma (~5x5cm) with sentinel node removal. 3/3 LN were negative for malignancy. She was to follow up Oncology for a clinical trial, however unsure if she has done so.     - HA, nausea, and vomiting for about 1 week.  - CT head showed multiple areas of vasogenic edema are representative of metastatic disease. The most prominent region is in the right inferior cerebellum anteriorly, with edema extending into the right kehinde. This is associated with moderate compression of the 4th ventricle, moderately displaced towards the left. This is associated with mild bilateral lateral ventricular and 3rd ventricular dilatation, with dilatation of the temporal horns and mild subependymal flow of CSF. 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 Additional vasogenic edema is seen in the medial right occipital lobe, the right mid parietal lobe where there is a possible subtle slightly high density mass measuring 2.0 x 1.4 cm, and the medial posterior left occipital lobe where there is a possible heterogeneous mass measuring 1.3 x 1.3 cm. Coarse calcification in the posterior-lateral left inferior cerebellar hemisphere with a small amount of edema around it.   - Chest x-ray with pulmonary nodules.  - Transferred to [facility] and Neurosurgery was consulted. CT chest/A/P and MRI brain have all been ordered and are pending. Further recommendations pending scans. On IV decadron.  - CMP and LFT's unremarkable. Last CBC done on 8/4 showed and Hgb of 20.2, Hct 60.0. Platelets and WBC's are WNL. Repeat CBC ordered.     Attending addendum:  54 years old female with history of

## 2024-08-08 NOTE — CARE COORDINATION
CM Update: Met with patient at bedside to discuss transition of care plan. Discharge plan is Home needs TBD. Completed HPOA with patient. Copy in the soft chart. IR guided biopsy today. CM/SW to follow. MT

## 2024-08-08 NOTE — PROCEDURES
PROCEDURE NOTE  Date: 8/8/2024   Name: Linh Parrish  YOB: 1970    Procedures: abdominopelvic mass biopsy    Spoke with bedside RN. Patient will need INR prior to biopsy. Will send for patient after INR results and schedule allows.

## 2024-08-08 NOTE — CONSULTS
organomegaly  Extremities: without cyanosis, clubbing, edema or asymmetry  Skin: No jaundice, purpura or petechiae  Neuro: Grossly Intact      Assessment and Plan    The patient understands the goal of treatment is palliation and would like to proceed with treatment as outlined above.  After seeing this patient, I did recommend a course of palliative whole brain radiotherapy for palliation.  I did have a long discussion with this patient regarding the risks, benefits, and alternatives to external beam radiotherapy.  We talked about the acute, short, long-term side effects and the patient had the opportunity to have her questions answered and would like to proceed with therapy.  Will plan for simulation tomorrow and beginning radiotherapy early next week.        Adin Machado MD  [facility]  Radiation Oncology        NOTE: This report was transcribed using voice recognition software. Every effort was made to ensure accuracy; however, inadvertent computerizedtranscription errors may be present.

## 2024-08-08 NOTE — PROGRESS NOTES
Department of Neurosurgery  Progress Note    CHIEF COMPLAINT: brain mets    SUBJECTIVE:  Sitting in bed, no new complaints.     REVIEW OF SYSTEMS :  Constitutional: Negative for chills and fever.    Neurological: Negative for dizziness, tremors and speech change.   CVS: negative for chest pain  Resp: negative for SOB    OBJECTIVE:   VITALS:  BP (!) 153/95   Pulse 61   Temp 97.5 °F (36.4 °C) (Temporal)   Resp 16   Ht 1.753 m (5' 9\")   Wt 105.3 kg (232 lb 1.6 oz)   SpO2 97%   BMI 34.28 kg/m²     PHYSICAL:  Alert, oriented  Appears stated age  PERRL  EOMI  Strength full  Sensation intact to light touch  Skin is warm  Abdomen is soft    DATA:  CBC:   Lab Results   Component Value Date/Time    WBC 5.8 08/07/2024 11:26 AM    RBC 6.25 08/07/2024 11:26 AM    HGB 18.4 08/07/2024 11:26 AM    HCT 54.9 08/07/2024 11:26 AM    MCV 87.8 08/07/2024 11:26 AM    MCH 29.4 08/07/2024 11:26 AM    MCHC 33.5 08/07/2024 11:26 AM    RDW 13.2 08/07/2024 11:26 AM     08/07/2024 11:26 AM    MPV 10.5 08/07/2024 11:26 AM     BMP:    Lab Results   Component Value Date/Time     08/04/2024 07:02 PM    K 4.8 08/04/2024 07:02 PM    K 4.2 06/15/2019 02:14 AM    CL 97 08/04/2024 07:02 PM    CO2 28 08/04/2024 07:02 PM    BUN 15 08/04/2024 07:02 PM    CREATININE 0.8 08/04/2024 07:02 PM    CALCIUM 10.1 08/04/2024 07:02 PM    GFRAA >60 06/15/2019 02:14 AM    LABGLOM >90 08/04/2024 07:02 PM    GLUCOSE 115 08/04/2024 07:02 PM     PT/INR:    Lab Results   Component Value Date/Time    PROTIME 11.8 08/08/2024 08:18 AM    INR 1.1 08/08/2024 08:18 AM     PTT:  No results found for: \"APTT\"[APTT}    Current Inpatient Medications  Current Facility-Administered Medications: dexAMETHasone (DECADRON) injection 4 mg, 4 mg, IntraVENous, Q6H  famotidine (PEPCID) tablet 20 mg, 20 mg, Oral, BID  atenolol (TENORMIN) tablet 50 mg, 50 mg, Oral, Daily  sodium chloride flush 0.9 % injection 5-40 mL, 5-40 mL, IntraVENous, 2 times per day  sodium chloride flush  0.9 % injection 5-40 mL, 5-40 mL, IntraVENous, PRN  0.9 % sodium chloride infusion, , IntraVENous, PRN  potassium chloride (KLOR-CON M) extended release tablet 40 mEq, 40 mEq, Oral, PRN **OR** potassium bicarb-citric acid (EFFER-K) effervescent tablet 40 mEq, 40 mEq, Oral, PRN **OR** potassium chloride 10 mEq/100 mL IVPB (Peripheral Line), 10 mEq, IntraVENous, PRN  magnesium sulfate 2000 mg in 50 mL IVPB premix, 2,000 mg, IntraVENous, PRN  ondansetron (ZOFRAN-ODT) disintegrating tablet 4 mg, 4 mg, Oral, Q8H PRN **OR** ondansetron (ZOFRAN) injection 4 mg, 4 mg, IntraVENous, Q6H PRN  polyethylene glycol (GLYCOLAX) packet 17 g, 17 g, Oral, Daily PRN  acetaminophen (TYLENOL) tablet 650 mg, 650 mg, Oral, Q6H PRN **OR** acetaminophen (TYLENOL) suppository 650 mg, 650 mg, Rectal, Q6H PRN    ASSESSMENT:   This is a 53 y/o female with multiple brain metastasis. CT Chest ABD and pelvis with multiple lung nodules and large abdominal pelvic mass    PLAN:  MRI reviewed and discussed with patient, no neurosurgical intervention at this time  IR consulted for biopsy of abdominal/pelvis mass  Med/Onc and Rad/Onc following      Electronically signed by CASSIA Harmon on 8/8/2024 at 10:32 AM    I have interviewed and examined the patient and agree with above.  She has multiple intracranial mets.  Recommend XRT    Ephraim Berg MD

## 2024-08-08 NOTE — PROGRESS NOTES
[facility] Medicine    Subjective:  pt alert conversive pts brother gillian bajwa at bedside / pt sched for bx today      Current Facility-Administered Medications:     dexAMETHasone (DECADRON) injection 4 mg, 4 mg, IntraVENous, Q6H, Ephraim Berg MD, 4 mg at 08/08/24 0511    famotidine (PEPCID) tablet 20 mg, 20 mg, Oral, BID, Ephraim Berg MD, 20 mg at 08/07/24 2012    atenolol (TENORMIN) tablet 50 mg, 50 mg, Oral, Daily, Pavan Mckeon, , 50 mg at 08/07/24 1022    sodium chloride flush 0.9 % injection 5-40 mL, 5-40 mL, IntraVENous, 2 times per day, Pavan Mckeon DO, 10 mL at 08/07/24 2012    sodium chloride flush 0.9 % injection 5-40 mL, 5-40 mL, IntraVENous, PRN, Pavan Mckeon,     0.9 % sodium chloride infusion, , IntraVENous, PRN, Pavan Mckeon,     potassium chloride (KLOR-CON M) extended release tablet 40 mEq, 40 mEq, Oral, PRN **OR** potassium bicarb-citric acid (EFFER-K) effervescent tablet 40 mEq, 40 mEq, Oral, PRN **OR** potassium chloride 10 mEq/100 mL IVPB (Peripheral Line), 10 mEq, IntraVENous, PRN, Pavan Mckeon,     magnesium sulfate 2000 mg in 50 mL IVPB premix, 2,000 mg, IntraVENous, PRN, Pavan Mckeon DO    ondansetron (ZOFRAN-ODT) disintegrating tablet 4 mg, 4 mg, Oral, Q8H PRN **OR** ondansetron (ZOFRAN) injection 4 mg, 4 mg, IntraVENous, Q6H PRN, Pavan Mckeon, , 4 mg at 08/07/24 1022    polyethylene glycol (GLYCOLAX) packet 17 g, 17 g, Oral, Daily PRN, Pavan Mckeon,     acetaminophen (TYLENOL) tablet 650 mg, 650 mg, Oral, Q6H PRN, 650 mg at 08/08/24 0511 **OR** acetaminophen (TYLENOL) suppository 650 mg, 650 mg, Rectal, Q6H PRN, Pavan Mckeon, DO    Objective:    BP (!) 153/95   Pulse 61   Temp 97.5 °F (36.4 °C) (Temporal)   Resp 16   Ht 1.753 m (5' 9\")   Wt 105.3 kg (232 lb 1.6 oz)   SpO2 97%   BMI 34.28 kg/m²     Heart:  reg  Lungs:  ctab  Abd: + bs soft nontender  Extrem:  w/o edema    CBC with Differential:    Lab Results   Component  Value Date/Time    WBC 5.8 08/07/2024 11:26 AM    RBC 6.25 08/07/2024 11:26 AM    HGB 18.4 08/07/2024 11:26 AM    HCT 54.9 08/07/2024 11:26 AM     08/07/2024 11:26 AM    MCV 87.8 08/07/2024 11:26 AM    MCH 29.4 08/07/2024 11:26 AM    MCHC 33.5 08/07/2024 11:26 AM    RDW 13.2 08/07/2024 11:26 AM    LYMPHOPCT 11 08/07/2024 11:26 AM    MONOPCT 2 08/07/2024 11:26 AM    EOSPCT 0 08/07/2024 11:26 AM    BASOPCT 0 08/07/2024 11:26 AM    MONOSABS 0.12 08/07/2024 11:26 AM    LYMPHSABS 0.63 08/07/2024 11:26 AM    EOSABS 0.00 08/07/2024 11:26 AM    BASOSABS 0.02 08/07/2024 11:26 AM     CMP:    Lab Results   Component Value Date/Time     08/04/2024 07:02 PM    K 4.8 08/04/2024 07:02 PM    K 4.2 06/15/2019 02:14 AM    CL 97 08/04/2024 07:02 PM    CO2 28 08/04/2024 07:02 PM    BUN 15 08/04/2024 07:02 PM    CREATININE 0.8 08/04/2024 07:02 PM    GFRAA >60 06/15/2019 02:14 AM    LABGLOM >90 08/04/2024 07:02 PM    GLUCOSE 115 08/04/2024 07:02 PM    CALCIUM 10.1 08/04/2024 07:02 PM    BILITOT 1.1 08/04/2024 07:02 PM    ALKPHOS 75 08/04/2024 07:02 PM    AST 26 08/04/2024 07:02 PM    ALT 12 08/04/2024 07:02 PM     Warfarin PT/INR:  No results found for: \"INR\", \"PROTIME\"    Assessment:    Principal Problem:    Brain mass  Active Problems:    Metastasis to brain (HCC)  Resolved Problems:    * No resolved hospital problems. *      Plan:  For bx today rad onc to see cont decadron per neurosurg        Pavan Mckeon DO  7:58 AM  8/8/2024

## 2024-08-09 ENCOUNTER — HOSPITAL ENCOUNTER (OUTPATIENT)
Dept: RADIATION ONCOLOGY | Age: 54
Discharge: HOME | End: 2024-08-09

## 2024-08-09 ENCOUNTER — APPOINTMENT (OUTPATIENT)
Dept: CT IMAGING | Age: 54
DRG: 080 | End: 2024-08-09
Attending: INTERNAL MEDICINE
Payer: COMMERCIAL

## 2024-08-09 PROCEDURE — 2060000000 HC ICU INTERMEDIATE R&B

## 2024-08-09 PROCEDURE — 6370000000 HC RX 637 (ALT 250 FOR IP): Performed by: NEUROLOGICAL SURGERY

## 2024-08-09 PROCEDURE — 6360000002 HC RX W HCPCS: Performed by: NEUROLOGICAL SURGERY

## 2024-08-09 PROCEDURE — 77290 THER RAD SIMULAJ FIELD CPLX: CPT | Performed by: RADIOLOGY

## 2024-08-09 PROCEDURE — 2580000003 HC RX 258: Performed by: INTERNAL MEDICINE

## 2024-08-09 PROCEDURE — 6360000002 HC RX W HCPCS: Performed by: RADIOLOGY

## 2024-08-09 PROCEDURE — 77334 RADIATION TREATMENT AID(S): CPT | Performed by: RADIOLOGY

## 2024-08-09 PROCEDURE — 2709999900 CT BIOPSY ABDOMEN RETROPERITONEUM

## 2024-08-09 PROCEDURE — 6370000000 HC RX 637 (ALT 250 FOR IP): Performed by: INTERNAL MEDICINE

## 2024-08-09 PROCEDURE — 0WBH3ZX EXCISION OF RETROPERITONEUM, PERCUTANEOUS APPROACH, DIAGNOSTIC: ICD-10-PCS | Performed by: RADIOLOGY

## 2024-08-09 PROCEDURE — 49180 BIOPSY ABDOMINAL MASS: CPT

## 2024-08-09 PROCEDURE — 2500000003 HC RX 250 WO HCPCS: Performed by: RADIOLOGY

## 2024-08-09 RX ORDER — AMLODIPINE BESYLATE 2.5 MG/1
2.5 TABLET ORAL DAILY
Status: DISCONTINUED | OUTPATIENT
Start: 2024-08-09 | End: 2024-08-09

## 2024-08-09 RX ORDER — LIDOCAINE HYDROCHLORIDE AND EPINEPHRINE BITARTRATE 20; .01 MG/ML; MG/ML
INJECTION, SOLUTION SUBCUTANEOUS PRN
Status: COMPLETED | OUTPATIENT
Start: 2024-08-09 | End: 2024-08-09

## 2024-08-09 RX ORDER — AMLODIPINE BESYLATE 5 MG/1
5 TABLET ORAL DAILY
Status: DISCONTINUED | OUTPATIENT
Start: 2024-08-10 | End: 2024-08-10 | Stop reason: HOSPADM

## 2024-08-09 RX ORDER — HYDRALAZINE HYDROCHLORIDE 20 MG/ML
5 INJECTION INTRAMUSCULAR; INTRAVENOUS EVERY 6 HOURS PRN
Status: DISCONTINUED | OUTPATIENT
Start: 2024-08-09 | End: 2024-08-10 | Stop reason: HOSPADM

## 2024-08-09 RX ORDER — DIPHENHYDRAMINE HYDROCHLORIDE 50 MG/ML
INJECTION INTRAMUSCULAR; INTRAVENOUS PRN
Status: COMPLETED | OUTPATIENT
Start: 2024-08-09 | End: 2024-08-09

## 2024-08-09 RX ORDER — MIDAZOLAM HYDROCHLORIDE 2 MG/2ML
INJECTION, SOLUTION INTRAMUSCULAR; INTRAVENOUS PRN
Status: COMPLETED | OUTPATIENT
Start: 2024-08-09 | End: 2024-08-09

## 2024-08-09 RX ORDER — LIDOCAINE HYDROCHLORIDE 20 MG/ML
INJECTION, SOLUTION INFILTRATION; PERINEURAL PRN
Status: COMPLETED | OUTPATIENT
Start: 2024-08-09 | End: 2024-08-09

## 2024-08-09 RX ORDER — AMLODIPINE BESYLATE 5 MG/1
2.5 TABLET ORAL ONCE
Status: COMPLETED | OUTPATIENT
Start: 2024-08-09 | End: 2024-08-09

## 2024-08-09 RX ORDER — FENTANYL CITRATE 50 UG/ML
INJECTION, SOLUTION INTRAMUSCULAR; INTRAVENOUS PRN
Status: COMPLETED | OUTPATIENT
Start: 2024-08-09 | End: 2024-08-09

## 2024-08-09 RX ADMIN — LIDOCAINE HYDROCHLORIDE 8 ML: 20 INJECTION, SOLUTION INFILTRATION; PERINEURAL at 13:34

## 2024-08-09 RX ADMIN — FENTANYL CITRATE 25 MCG: 50 INJECTION, SOLUTION INTRAMUSCULAR; INTRAVENOUS at 13:30

## 2024-08-09 RX ADMIN — SODIUM CHLORIDE, PRESERVATIVE FREE 10 ML: 5 INJECTION INTRAVENOUS at 21:00

## 2024-08-09 RX ADMIN — MIDAZOLAM HYDROCHLORIDE 0.5 MG: 1 INJECTION, SOLUTION INTRAMUSCULAR; INTRAVENOUS at 13:34

## 2024-08-09 RX ADMIN — DEXAMETHASONE SODIUM PHOSPHATE 4 MG: 4 INJECTION INTRA-ARTICULAR; INTRALESIONAL; INTRAMUSCULAR; INTRAVENOUS; SOFT TISSUE at 18:14

## 2024-08-09 RX ADMIN — ACETAMINOPHEN 650 MG: 325 TABLET ORAL at 05:09

## 2024-08-09 RX ADMIN — SODIUM CHLORIDE, PRESERVATIVE FREE 10 ML: 5 INJECTION INTRAVENOUS at 08:40

## 2024-08-09 RX ADMIN — DEXAMETHASONE SODIUM PHOSPHATE 4 MG: 4 INJECTION INTRA-ARTICULAR; INTRALESIONAL; INTRAMUSCULAR; INTRAVENOUS; SOFT TISSUE at 00:21

## 2024-08-09 RX ADMIN — MIDAZOLAM HYDROCHLORIDE 0.5 MG: 1 INJECTION, SOLUTION INTRAMUSCULAR; INTRAVENOUS at 13:29

## 2024-08-09 RX ADMIN — FAMOTIDINE 20 MG: 20 TABLET, FILM COATED ORAL at 21:00

## 2024-08-09 RX ADMIN — DEXAMETHASONE SODIUM PHOSPHATE 4 MG: 4 INJECTION INTRA-ARTICULAR; INTRALESIONAL; INTRAMUSCULAR; INTRAVENOUS; SOFT TISSUE at 05:09

## 2024-08-09 RX ADMIN — THROMBIN HUMAN 1 KIT: 2000 POWDER, FOR SOLUTION TOPICAL at 13:38

## 2024-08-09 RX ADMIN — ATENOLOL 50 MG: 50 TABLET ORAL at 08:40

## 2024-08-09 RX ADMIN — FAMOTIDINE 20 MG: 20 TABLET, FILM COATED ORAL at 08:40

## 2024-08-09 RX ADMIN — LIDOCAINE HYDROCHLORIDE,EPINEPHRINE BITARTRATE 8 ML: 20; .01 INJECTION, SOLUTION INFILTRATION; PERINEURAL at 13:33

## 2024-08-09 RX ADMIN — FENTANYL CITRATE 25 MCG: 50 INJECTION, SOLUTION INTRAMUSCULAR; INTRAVENOUS at 13:35

## 2024-08-09 RX ADMIN — DIPHENHYDRAMINE HYDROCHLORIDE 25 MG: 50 INJECTION, SOLUTION INTRAMUSCULAR; INTRAVENOUS at 13:29

## 2024-08-09 RX ADMIN — AMLODIPINE BESYLATE 2.5 MG: 5 TABLET ORAL at 15:11

## 2024-08-09 RX ADMIN — AMLODIPINE BESYLATE 2.5 MG: 2.5 TABLET ORAL at 08:40

## 2024-08-09 ASSESSMENT — PAIN SCALES - GENERAL: PAINLEVEL_OUTOF10: 8

## 2024-08-09 ASSESSMENT — PAIN - FUNCTIONAL ASSESSMENT: PAIN_FUNCTIONAL_ASSESSMENT: ACTIVITIES ARE NOT PREVENTED

## 2024-08-09 ASSESSMENT — PAIN DESCRIPTION - LOCATION: LOCATION: HEAD

## 2024-08-09 ASSESSMENT — PAIN DESCRIPTION - ORIENTATION: ORIENTATION: RIGHT;LEFT

## 2024-08-09 ASSESSMENT — PAIN DESCRIPTION - DESCRIPTORS: DESCRIPTORS: ACHING;HEAVINESS;NAGGING

## 2024-08-09 NOTE — PROCEDURES
1348Patient returned from procedure  abdominal mass biopsy.. Dressing checked, clean, dry, and intact. Patient stable, awake and alert. No s/s of complications noted or reported. Vitals will be checked q 15min, see flow sheets.  Report called to floor RN by procedural RN.      Site was checked with every set of vitals. Site clean dry and intact. Patient in stable condition, no s/s of complications noted or reported. Transfer back to floor

## 2024-08-09 NOTE — PROGRESS NOTES
Patient is inpatient. She is here for SIM and education and handouts given Patient verbalized understanding of care. She is consenting of treatment. All questions were answered from a nursing perspective and she expressed understanding of care.

## 2024-08-09 NOTE — PROGRESS NOTES
Patient arrived via wheelchair to Radiology department for abdominal biopsy. Allergies, home medications, H&P and fasting instructions reviewed with patient. Vital signs taken.Procedural instructions given, questions answered, understanding expressed and consent signed. Patient given fluoroscopy education, no questions at this time.

## 2024-08-09 NOTE — BRIEF OP NOTE
Brief Postoperative Note      Patient: Linh Parrish  YOB: 1970  MRN: [id number]    Date of Procedure: 8/9/2024    Abdominal mass    Post-Op Diagnosis: Same       CT guided abdominal mass biopsy    MOSHE Smith    Assistant:  * No surgical staff found *    Anesthesia: * No anesthesia type entered *    Estimated Blood Loss (mL): Minimal    Complications: None    Specimens:   Pelvic mass    Implants:  * No implants in log *      Drains: * No LDAs found *    Findings:  Infection Present At Time Of Surgery (PATOS) (choose all levels that have infection present):  No infection present  Other Findings: 18 G cores of abdominal mass    Electronically signed by Jeni Smith MD on 8/9/2024 at 1:40 PM

## 2024-08-09 NOTE — CARE COORDINATION
CM Update: Met with patient at bedside to discuss transition of care plan. Discharge plan is Home needs TBD. IR guided biopsy 8/9. CM/SW to follow. MT

## 2024-08-09 NOTE — PROGRESS NOTES
Spoke with Dr Mckeon regarding pt /108  Per Dr Mckeon, order 2.5 PO norvasc once now, increase norvasc to 5 mg daily starting tomorrow and IV hydralazine 5 mg q6prn for SBP >180 AND DBP >100

## 2024-08-09 NOTE — PROGRESS NOTES
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      Continue steroids.  Radiation oncology for wbrt.   IR consult for biopsy of large pelvic lesion.  Will continue to follow.    8/8/24  - History of stage IIc, T4b N0 melanoma on left shoulder s/p wide local excision and sentinel lymph node dissection in 2021 now with widespread brain lung and adrenal metastasis and a large abdominal pelvic mass.   - MRI brain showed  MULTIPLE INTRACRANIAL METASTASES. The largest is in the right cerebellar hemisphere, measuring 3.4 x 2.7 cm. Vasogenic edema is seen with the metastasis, most prominently in the right cerebellar hemisphere. There is mass effect on the 4th ventricle and the right foramen of Luschka. MILD OBSTRUCTIVE HYDROCEPHALUS related to narrowing of the 4th ventricle. No acute infarct.   - CT chest showed Multiple bilateral lung nodules, consistent with metastatic disease. The largest is noted in the right upper lobe, measuring 2.8 x 2.7 cm. Right hilar and mediastinal lymphadenopathy. Subcentimeter thyroid nodules.    - CT abdomen showed Extremely large, complex, heterogeneous soft tissue masslike entity emanating from the central lower pelvis, extending to the mid abdomen, measuring 30.0 x 12.5 x 25.9 cm in size. This either represents a significantly enlarged leiomyomatous uterus or a very large mass intimately associated with the uterus. There may be normal uterine tissue seen ventrally. Given the metastatic disease to the lungs and possible metastatic disease to the adrenal glands, the presence of a uterine sarcoma or other uterine neoplasm needs to be ruled out. Bilateral adrenal gland nodules are noted.  Given the metastatic disease to the lungs present, this could represent adrenal metastases. There is a suggestion of a faint, small, rounded hypodensity within

## 2024-08-09 NOTE — ACP (ADVANCE CARE PLANNING)
Advance Care Planning   Healthcare Decision Maker:    Primary Decision Maker: Jeremiah Bajwa - Brother/Sister - [phone]    Primary Decision Maker: peyton bajwa - Brother/Sister - [phone]    Click here to complete Healthcare Decision Makers including selection of the Healthcare Decision Maker Relationship (ie \"Primary\").

## 2024-08-09 NOTE — PROGRESS NOTES
[facility] Medicine    Subjective:  pt alert active npo for bx pelvic mass today      Current Facility-Administered Medications:     amLODIPine (NORVASC) tablet 2.5 mg, 2.5 mg, Oral, Daily, Pavan Mckeon DO    dexAMETHasone (DECADRON) injection 4 mg, 4 mg, IntraVENous, Q6H, Ephraim Berg MD, 4 mg at 08/09/24 0509    famotidine (PEPCID) tablet 20 mg, 20 mg, Oral, BID, Ephraim Berg MD, 20 mg at 08/08/24 2037    atenolol (TENORMIN) tablet 50 mg, 50 mg, Oral, Daily, Pavan Mckeon DO, 50 mg at 08/07/24 1022    sodium chloride flush 0.9 % injection 5-40 mL, 5-40 mL, IntraVENous, 2 times per day, Pavan Mckeon DO, 10 mL at 08/08/24 2038    sodium chloride flush 0.9 % injection 5-40 mL, 5-40 mL, IntraVENous, PRN, Pavan Mckeon DO    0.9 % sodium chloride infusion, , IntraVENous, PRN, Pavan Mckeon DO    potassium chloride (KLOR-CON M) extended release tablet 40 mEq, 40 mEq, Oral, PRN **OR** potassium bicarb-citric acid (EFFER-K) effervescent tablet 40 mEq, 40 mEq, Oral, PRN **OR** potassium chloride 10 mEq/100 mL IVPB (Peripheral Line), 10 mEq, IntraVENous, PRN, Pavan Mckeon DO    magnesium sulfate 2000 mg in 50 mL IVPB premix, 2,000 mg, IntraVENous, PRN, Pavan Mckeon DO    ondansetron (ZOFRAN-ODT) disintegrating tablet 4 mg, 4 mg, Oral, Q8H PRN **OR** ondansetron (ZOFRAN) injection 4 mg, 4 mg, IntraVENous, Q6H PRN, Pavan Mckeon DO, 4 mg at 08/07/24 1022    polyethylene glycol (GLYCOLAX) packet 17 g, 17 g, Oral, Daily PRN, Pavan Mckeon DO    acetaminophen (TYLENOL) tablet 650 mg, 650 mg, Oral, Q6H PRN, 650 mg at 08/09/24 0509 **OR** acetaminophen (TYLENOL) suppository 650 mg, 650 mg, Rectal, Q6H PRN, Pavan Mckeon, DO    Objective:    BP (!) 154/96   Pulse 61   Temp 97.3 °F (36.3 °C) (Temporal)   Resp 16   Ht 1.753 m (5' 9\")   Wt 105.3 kg (232 lb 1.6 oz)   SpO2 99%   BMI 34.28 kg/m²     Heart:  reg  Lungs:  ctab  Abd: + bs soft nontender  Extrem:  w/o edema    CBC  with Differential:    Lab Results   Component Value Date/Time    WBC 5.8 08/07/2024 11:26 AM    RBC 6.25 08/07/2024 11:26 AM    HGB 18.4 08/07/2024 11:26 AM    HCT 54.9 08/07/2024 11:26 AM     08/07/2024 11:26 AM    MCV 87.8 08/07/2024 11:26 AM    MCH 29.4 08/07/2024 11:26 AM    MCHC 33.5 08/07/2024 11:26 AM    RDW 13.2 08/07/2024 11:26 AM    LYMPHOPCT 11 08/07/2024 11:26 AM    MONOPCT 2 08/07/2024 11:26 AM    EOSPCT 0 08/07/2024 11:26 AM    BASOPCT 0 08/07/2024 11:26 AM    MONOSABS 0.12 08/07/2024 11:26 AM    LYMPHSABS 0.63 08/07/2024 11:26 AM    EOSABS 0.00 08/07/2024 11:26 AM    BASOSABS 0.02 08/07/2024 11:26 AM     CMP:    Lab Results   Component Value Date/Time     08/04/2024 07:02 PM    K 4.8 08/04/2024 07:02 PM    K 4.2 06/15/2019 02:14 AM    CL 97 08/04/2024 07:02 PM    CO2 28 08/04/2024 07:02 PM    BUN 15 08/04/2024 07:02 PM    CREATININE 0.8 08/04/2024 07:02 PM    GFRAA >60 06/15/2019 02:14 AM    LABGLOM >90 08/04/2024 07:02 PM    GLUCOSE 115 08/04/2024 07:02 PM    CALCIUM 10.1 08/04/2024 07:02 PM    BILITOT 1.1 08/04/2024 07:02 PM    ALKPHOS 75 08/04/2024 07:02 PM    AST 26 08/04/2024 07:02 PM    ALT 12 08/04/2024 07:02 PM     Warfarin PT/INR:    Lab Results   Component Value Date    INR 1.1 08/08/2024    PROTIME 11.8 08/08/2024       Assessment:    Principal Problem:    Brain mass  Active Problems:    Metastasis to brain (HCC)  Resolved Problems:    * No resolved hospital problems. *  Pelvic mass    Plan:  Bx pelvic mass / appreciate onc and neurosurg input        Pavan Mckeon, DO  7:38 AM  8/9/2024

## 2024-08-10 VITALS
WEIGHT: 232.1 LBS | BODY MASS INDEX: 34.38 KG/M2 | HEART RATE: 60 BPM | DIASTOLIC BLOOD PRESSURE: 74 MMHG | TEMPERATURE: 98.1 F | SYSTOLIC BLOOD PRESSURE: 134 MMHG | OXYGEN SATURATION: 97 % | HEIGHT: 69 IN | RESPIRATION RATE: 18 BRPM

## 2024-08-10 PROCEDURE — 2580000003 HC RX 258: Performed by: INTERNAL MEDICINE

## 2024-08-10 PROCEDURE — 6370000000 HC RX 637 (ALT 250 FOR IP): Performed by: INTERNAL MEDICINE

## 2024-08-10 PROCEDURE — 6360000002 HC RX W HCPCS: Performed by: NEUROLOGICAL SURGERY

## 2024-08-10 PROCEDURE — 6370000000 HC RX 637 (ALT 250 FOR IP): Performed by: NEUROLOGICAL SURGERY

## 2024-08-10 RX ORDER — AMLODIPINE BESYLATE 5 MG/1
5 TABLET ORAL DAILY
Qty: 30 TABLET | Refills: 3 | Status: SHIPPED | OUTPATIENT
Start: 2024-08-11

## 2024-08-10 RX ORDER — DEXAMETHASONE 2 MG/1
2 TABLET ORAL 2 TIMES DAILY WITH MEALS
Qty: 20 TABLET | Refills: 0 | Status: SHIPPED | OUTPATIENT
Start: 2024-08-10 | End: 2024-08-20

## 2024-08-10 RX ADMIN — ATENOLOL 50 MG: 50 TABLET ORAL at 08:16

## 2024-08-10 RX ADMIN — DEXAMETHASONE SODIUM PHOSPHATE 4 MG: 4 INJECTION INTRA-ARTICULAR; INTRALESIONAL; INTRAMUSCULAR; INTRAVENOUS; SOFT TISSUE at 00:39

## 2024-08-10 RX ADMIN — DEXAMETHASONE SODIUM PHOSPHATE 4 MG: 4 INJECTION INTRA-ARTICULAR; INTRALESIONAL; INTRAMUSCULAR; INTRAVENOUS; SOFT TISSUE at 05:43

## 2024-08-10 RX ADMIN — DEXAMETHASONE SODIUM PHOSPHATE 4 MG: 4 INJECTION INTRA-ARTICULAR; INTRALESIONAL; INTRAMUSCULAR; INTRAVENOUS; SOFT TISSUE at 14:58

## 2024-08-10 RX ADMIN — AMLODIPINE BESYLATE 5 MG: 5 TABLET ORAL at 08:16

## 2024-08-10 RX ADMIN — SODIUM CHLORIDE, PRESERVATIVE FREE 10 ML: 5 INJECTION INTRAVENOUS at 08:16

## 2024-08-10 RX ADMIN — FAMOTIDINE 20 MG: 20 TABLET, FILM COATED ORAL at 08:16

## 2024-08-10 NOTE — PROGRESS NOTES
Call placed to Dr. Machado to see if pt is able to discharge from a rad/onc standpoint.    1531: okay to discharge per Dr. Machado

## 2024-08-10 NOTE — PROGRESS NOTES
Called answering service for [facility] to see if patient able to discharge, will await callback.      Okay to discharge per Dr. Belcher

## 2024-08-10 NOTE — PROGRESS NOTES
Called Dr. Berg to see if patient able to discharge from neurosurg standpoint    Okay to discharge per Dr. Berg

## 2024-08-10 NOTE — PROGRESS NOTES
[facility] Medicine    Subjective: Postbiopsy  Would like to go home as soon as possible  Headache has improved      Current Facility-Administered Medications:     amLODIPine (NORVASC) tablet 5 mg, 5 mg, Oral, Daily, Pavan Mckeon DO, 5 mg at 08/10/24 0816    hydrALAZINE (APRESOLINE) injection 5 mg, 5 mg, IntraVENous, Q6H PRN, Pavan Mckeon DO    dexAMETHasone (DECADRON) injection 4 mg, 4 mg, IntraVENous, Q6H, Ephraim Berg MD, 4 mg at 08/10/24 0543    famotidine (PEPCID) tablet 20 mg, 20 mg, Oral, BID, Ephraim Berg MD, 20 mg at 08/10/24 0816    atenolol (TENORMIN) tablet 50 mg, 50 mg, Oral, Daily, Pavan Mckeon DO, 50 mg at 08/10/24 0816    sodium chloride flush 0.9 % injection 5-40 mL, 5-40 mL, IntraVENous, 2 times per day, Pavan Mckeon DO, 10 mL at 08/10/24 0816    sodium chloride flush 0.9 % injection 5-40 mL, 5-40 mL, IntraVENous, PRN, Pavan Mckeon DO    0.9 % sodium chloride infusion, , IntraVENous, PRN, Pavan Mckeon DO    potassium chloride (KLOR-CON M) extended release tablet 40 mEq, 40 mEq, Oral, PRN **OR** potassium bicarb-citric acid (EFFER-K) effervescent tablet 40 mEq, 40 mEq, Oral, PRN **OR** potassium chloride 10 mEq/100 mL IVPB (Peripheral Line), 10 mEq, IntraVENous, PRN, Pavan Mckeon DO    magnesium sulfate 2000 mg in 50 mL IVPB premix, 2,000 mg, IntraVENous, PRN, Pavan Mckeon,     ondansetron (ZOFRAN-ODT) disintegrating tablet 4 mg, 4 mg, Oral, Q8H PRN **OR** ondansetron (ZOFRAN) injection 4 mg, 4 mg, IntraVENous, Q6H PRN, Pvaan Mckeon DO, 4 mg at 08/07/24 1022    polyethylene glycol (GLYCOLAX) packet 17 g, 17 g, Oral, Daily PRN, Pavan Mckeon, DO    acetaminophen (TYLENOL) tablet 650 mg, 650 mg, Oral, Q6H PRN, 650 mg at 08/09/24 0509 **OR** acetaminophen (TYLENOL) suppository 650 mg, 650 mg, Rectal, Q6H PRN, Pavan Mckeon, DO    Objective:    BP (!) 170/94   Pulse 66   Temp 98.1 °F (36.7 °C) (Temporal)   Resp 16   Ht 1.753 m (5' 9\")

## 2024-08-10 NOTE — PLAN OF CARE
Problem: Discharge Planning  Goal: Discharge to home or other facility with appropriate resources  8/6/2024 2303 by Vashti Barrera, RN  Outcome: Progressing  8/6/2024 1829 by Dean Benitez RN  Outcome: Progressing  Flowsheets (Taken 8/6/2024 1826)  Discharge to home or other facility with appropriate resources: Identify barriers to discharge with patient and caregiver     Problem: Safety - Adult  Goal: Free from fall injury  8/6/2024 2303 by Vashti Barrera, RN  Outcome: Progressing  8/6/2024 1829 by Dean Benitez RN  Outcome: Progressing     
  Problem: Discharge Planning  Goal: Discharge to home or other facility with appropriate resources  Outcome: Progressing     Problem: Safety - Adult  Goal: Free from fall injury  Outcome: Progressing     Problem: Pain  Goal: Verbalizes/displays adequate comfort level or baseline comfort level  Outcome: Progressing     
  Problem: Discharge Planning  Goal: Discharge to home or other facility with appropriate resources  Outcome: Progressing  Flowsheets (Taken 8/6/2024 3932)  Discharge to home or other facility with appropriate resources: Identify barriers to discharge with patient and caregiver     Problem: Safety - Adult  Goal: Free from fall injury  Outcome: Progressing     
Advanced care planning performed, see note. Patient DNR/DNI

## 2024-08-11 ENCOUNTER — PATIENT MESSAGE (OUTPATIENT)
Dept: PRIMARY CARE CLINIC | Age: 54
End: 2024-08-11

## 2024-08-11 DIAGNOSIS — C79.31 METASTATIC CANCER TO BRAIN (HCC): Primary | ICD-10-CM

## 2024-08-11 DIAGNOSIS — C43.9 MALIGNANT MELANOMA, UNSPECIFIED SITE (HCC): ICD-10-CM

## 2024-08-14 ENCOUNTER — APPOINTMENT (OUTPATIENT)
Dept: CT IMAGING | Age: 54
End: 2024-08-14
Payer: COMMERCIAL

## 2024-08-14 ENCOUNTER — HOSPITAL ENCOUNTER (OUTPATIENT)
Dept: RADIATION ONCOLOGY | Age: 54
Discharge: HOME | End: 2024-08-14
Payer: COMMERCIAL

## 2024-08-14 ENCOUNTER — APPOINTMENT (OUTPATIENT)
Dept: GENERAL RADIOLOGY | Age: 54
End: 2024-08-14
Payer: COMMERCIAL

## 2024-08-14 VITALS
HEART RATE: 75 BPM | TEMPERATURE: 98.7 F | BODY MASS INDEX: 18.07 KG/M2 | RESPIRATION RATE: 18 BRPM | OXYGEN SATURATION: 98 % | DIASTOLIC BLOOD PRESSURE: 102 MMHG | HEIGHT: 69 IN | SYSTOLIC BLOOD PRESSURE: 149 MMHG | WEIGHT: 122 LBS

## 2024-08-14 LAB
ABO + RH BLD: NORMAL
ALBUMIN SERPL-MCNC: 4.5 G/DL (ref 3.5–5.2)
ALP SERPL-CCNC: 70 U/L (ref 35–104)
ALT SERPL-CCNC: 36 U/L (ref 0–32)
ANION GAP SERPL CALCULATED.3IONS-SCNC: 13 MMOL/L (ref 7–16)
ARM BAND NUMBER: NORMAL
AST SERPL-CCNC: 25 U/L (ref 0–31)
BASOPHILS # BLD: 0.04 K/UL (ref 0–0.2)
BASOPHILS NFR BLD: 0 % (ref 0–2)
BILIRUB SERPL-MCNC: 0.6 MG/DL (ref 0–1.2)
BLOOD BANK SAMPLE EXPIRATION: NORMAL
BLOOD GROUP ANTIBODIES SERPL: NEGATIVE
BUN SERPL-MCNC: 16 MG/DL (ref 6–20)
CALCIUM SERPL-MCNC: 9.7 MG/DL (ref 8.6–10.2)
CHLORIDE SERPL-SCNC: 98 MMOL/L (ref 98–107)
CO2 SERPL-SCNC: 26 MMOL/L (ref 22–29)
CREAT SERPL-MCNC: 0.6 MG/DL (ref 0.5–1)
EOSINOPHIL # BLD: 0.01 K/UL (ref 0.05–0.5)
EOSINOPHILS RELATIVE PERCENT: 0 % (ref 0–6)
ERYTHROCYTE [DISTWIDTH] IN BLOOD BY AUTOMATED COUNT: 14.6 % (ref 11.5–15)
GFR, ESTIMATED: >90 ML/MIN/1.73M2
GLUCOSE SERPL-MCNC: 163 MG/DL (ref 74–99)
HCT VFR BLD AUTO: 60.4 % (ref 34–48)
HGB BLD-MCNC: 20.2 G/DL (ref 11.5–15.5)
IMM GRANULOCYTES # BLD AUTO: 0.2 K/UL (ref 0–0.58)
IMM GRANULOCYTES NFR BLD: 2 % (ref 0–5)
LACTATE BLDV-SCNC: 3.2 MMOL/L (ref 0.5–2.2)
LIPASE SERPL-CCNC: 17 U/L (ref 13–60)
LYMPHOCYTES NFR BLD: 1.23 K/UL (ref 1.5–4)
LYMPHOCYTES RELATIVE PERCENT: 12 % (ref 20–42)
MCH RBC QN AUTO: 29.4 PG (ref 26–35)
MCHC RBC AUTO-ENTMCNC: 33.4 G/DL (ref 32–34.5)
MCV RBC AUTO: 88 FL (ref 80–99.9)
MONOCYTES NFR BLD: 0.58 K/UL (ref 0.1–0.95)
MONOCYTES NFR BLD: 6 % (ref 2–12)
NEUTROPHILS NFR BLD: 80 % (ref 43–80)
NEUTS SEG NFR BLD: 8.22 K/UL (ref 1.8–7.3)
PLATELET # BLD AUTO: 185 K/UL (ref 130–450)
PMV BLD AUTO: 9.7 FL (ref 7–12)
POTASSIUM SERPL-SCNC: 4.8 MMOL/L (ref 3.5–5)
PROT SERPL-MCNC: 7.4 G/DL (ref 6.4–8.3)
RBC # BLD AUTO: 6.86 M/UL (ref 3.5–5.5)
SODIUM SERPL-SCNC: 137 MMOL/L (ref 132–146)
SURGICAL PATHOLOGY REPORT: NORMAL
WBC OTHER # BLD: 10.3 K/UL (ref 4.5–11.5)

## 2024-08-14 PROCEDURE — 70450 CT HEAD/BRAIN W/O DYE: CPT

## 2024-08-14 PROCEDURE — 86850 RBC ANTIBODY SCREEN: CPT

## 2024-08-14 PROCEDURE — 86900 BLOOD TYPING SEROLOGIC ABO: CPT

## 2024-08-14 PROCEDURE — 83690 ASSAY OF LIPASE: CPT

## 2024-08-14 PROCEDURE — 2580000003 HC RX 258

## 2024-08-14 PROCEDURE — 74177 CT ABD & PELVIS W/CONTRAST: CPT

## 2024-08-14 PROCEDURE — 85025 COMPLETE CBC W/AUTO DIFF WBC: CPT

## 2024-08-14 PROCEDURE — 82140 ASSAY OF AMMONIA: CPT

## 2024-08-14 PROCEDURE — 77334 RADIATION TREATMENT AID(S): CPT | Performed by: RADIOLOGY

## 2024-08-14 PROCEDURE — 83605 ASSAY OF LACTIC ACID: CPT

## 2024-08-14 PROCEDURE — 99285 EMERGENCY DEPT VISIT HI MDM: CPT

## 2024-08-14 PROCEDURE — 6360000004 HC RX CONTRAST MEDICATION: Performed by: RADIOLOGY

## 2024-08-14 PROCEDURE — 86901 BLOOD TYPING SEROLOGIC RH(D): CPT

## 2024-08-14 PROCEDURE — 80053 COMPREHEN METABOLIC PANEL: CPT

## 2024-08-14 PROCEDURE — 71046 X-RAY EXAM CHEST 2 VIEWS: CPT

## 2024-08-14 PROCEDURE — 77307 TELETHX ISODOSE PLAN CPLX: CPT | Performed by: RADIOLOGY

## 2024-08-14 RX ORDER — 0.9 % SODIUM CHLORIDE 0.9 %
1000 INTRAVENOUS SOLUTION INTRAVENOUS ONCE
Status: COMPLETED | OUTPATIENT
Start: 2024-08-14 | End: 2024-08-15

## 2024-08-14 RX ADMIN — SODIUM CHLORIDE 1000 ML: 9 INJECTION, SOLUTION INTRAVENOUS at 22:45

## 2024-08-14 RX ADMIN — IOPAMIDOL 75 ML: 755 INJECTION, SOLUTION INTRAVENOUS at 23:56

## 2024-08-14 ASSESSMENT — PAIN - FUNCTIONAL ASSESSMENT: PAIN_FUNCTIONAL_ASSESSMENT: NONE - DENIES PAIN

## 2024-08-15 ENCOUNTER — HOSPITAL ENCOUNTER (EMERGENCY)
Age: 54
Discharge: LEFT BEFORE DISCHARGE | End: 2024-08-15
Payer: COMMERCIAL

## 2024-08-15 DIAGNOSIS — Z53.21 ELOPED FROM EMERGENCY DEPARTMENT: Primary | ICD-10-CM

## 2024-08-15 LAB — AMMONIA PLAS-SCNC: 21 UMOL/L (ref 11–51)

## 2024-08-15 NOTE — ED PROVIDER NOTES
Department of Emergency Medicine  FIRST PROVIDER TRIAGE NOTE             Independent MLP           8/14/24  10:08 PM EDT    Date of Encounter: 8/14/24   MRN: [id number]      HPI: Linh Parrish is a 54 y.o. female who presents to the ED for evaluation of coffee-ground emesis and nausea which started 3 to 4 days ago.  She is post pelvic mass biopsy on 8/9/2024.  Recently diagnosed with brain metastasis.  Is currently living with her brother and sister-in-law.  He is concerned that she is increasingly confused and complaining of worsening headaches.    ROS: Negative for cp, sob, abd pain, back pain, fever, diarrhea, urinary complaints, or dizziness.    PE: Gen Appearance/Constitutional: confused appearing  CV: regular rate  Pulm: CTA bilat  GI: soft and NT    Provider-Patient relationship only established for Provider In Triage (PIT)  Full assessment, HPI and examination not performed, therefore, it is not yet possible to state whether or not an emergency medical condition exists   Focused assessment only during triage. This is not a comprehensive evaluation due to no physical ER space, staff shortage and high numbers of boarding patients in the ER     Initial Plan of Care: All treatment areas with department are currently occupied. Plan to order/Initiate the following while awaiting opening in ED: labs and imaging studies.  Initiate Treatment-Testing, Proceed toTreatment Area When Bed Available for ED Attending/MLP to Continue Care    Electronically signed by TIGRE Pope CNP   DD: 8/14/24        Sommer Matthew APRN - CNP  08/14/24 2898

## 2024-08-16 ENCOUNTER — TELEPHONE (OUTPATIENT)
Dept: CASE MANAGEMENT | Age: 54
End: 2024-08-16

## 2024-08-16 NOTE — TELEPHONE ENCOUNTER
Received [facility] papers for patient. Forms were completed, signed and faxed to [facility]. Confirmation of fax received.

## 2024-08-19 ENCOUNTER — HOSPITAL ENCOUNTER (OUTPATIENT)
Dept: RADIATION ONCOLOGY | Age: 54
Discharge: HOME | End: 2024-08-19
Payer: COMMERCIAL

## 2024-08-19 ENCOUNTER — HOSPITAL ENCOUNTER (INPATIENT)
Age: 54
LOS: 3 days | Discharge: HOME | DRG: 392 | End: 2024-08-22
Attending: EMERGENCY MEDICINE | Admitting: INTERNAL MEDICINE
Payer: COMMERCIAL

## 2024-08-19 DIAGNOSIS — R19.00 PELVIC MASS: ICD-10-CM

## 2024-08-19 DIAGNOSIS — C79.31 METASTASIS TO BRAIN (HCC): Primary | ICD-10-CM

## 2024-08-19 LAB
ALBUMIN SERPL-MCNC: 4 G/DL (ref 3.5–5.2)
ALP SERPL-CCNC: 88 U/L (ref 35–104)
ALT SERPL-CCNC: 21 U/L (ref 0–32)
ANION GAP SERPL CALCULATED.3IONS-SCNC: 13 MMOL/L (ref 7–16)
AST SERPL-CCNC: 20 U/L (ref 0–31)
BASOPHILS # BLD: 0.04 K/UL (ref 0–0.2)
BASOPHILS NFR BLD: 0 % (ref 0–2)
BILIRUB SERPL-MCNC: 0.4 MG/DL (ref 0–1.2)
BUN SERPL-MCNC: 18 MG/DL (ref 6–20)
CALCIUM SERPL-MCNC: 9.6 MG/DL (ref 8.6–10.2)
CHLORIDE SERPL-SCNC: 100 MMOL/L (ref 98–107)
CO2 SERPL-SCNC: 24 MMOL/L (ref 22–29)
CREAT SERPL-MCNC: 0.7 MG/DL (ref 0.5–1)
EOSINOPHIL # BLD: 0.04 K/UL (ref 0.05–0.5)
EOSINOPHILS RELATIVE PERCENT: 0 % (ref 0–6)
ERYTHROCYTE [DISTWIDTH] IN BLOOD BY AUTOMATED COUNT: 13.4 % (ref 11.5–15)
GFR, ESTIMATED: >90 ML/MIN/1.73M2
GLUCOSE SERPL-MCNC: 165 MG/DL (ref 74–99)
HCT VFR BLD AUTO: 53.8 % (ref 34–48)
HGB BLD-MCNC: 18.2 G/DL (ref 11.5–15.5)
IMM GRANULOCYTES # BLD AUTO: 0.15 K/UL (ref 0–0.58)
IMM GRANULOCYTES NFR BLD: 1 % (ref 0–5)
LYMPHOCYTES NFR BLD: 1.98 K/UL (ref 1.5–4)
LYMPHOCYTES RELATIVE PERCENT: 17 % (ref 20–42)
MCH RBC QN AUTO: 30.7 PG (ref 26–35)
MCHC RBC AUTO-ENTMCNC: 33.8 G/DL (ref 32–34.5)
MCV RBC AUTO: 90.7 FL (ref 80–99.9)
MONOCYTES NFR BLD: 0.66 K/UL (ref 0.1–0.95)
MONOCYTES NFR BLD: 6 % (ref 2–12)
NEUTROPHILS NFR BLD: 76 % (ref 43–80)
NEUTS SEG NFR BLD: 9.03 K/UL (ref 1.8–7.3)
PLATELET # BLD AUTO: 183 K/UL (ref 130–450)
PMV BLD AUTO: 9.9 FL (ref 7–12)
POTASSIUM SERPL-SCNC: 4.3 MMOL/L (ref 3.5–5)
PROT SERPL-MCNC: 7 G/DL (ref 6.4–8.3)
RBC # BLD AUTO: 5.93 M/UL (ref 3.5–5.5)
SODIUM SERPL-SCNC: 137 MMOL/L (ref 132–146)
WBC OTHER # BLD: 11.9 K/UL (ref 4.5–11.5)

## 2024-08-19 PROCEDURE — 93005 ELECTROCARDIOGRAM TRACING: CPT | Performed by: STUDENT IN AN ORGANIZED HEALTH CARE EDUCATION/TRAINING PROGRAM

## 2024-08-19 PROCEDURE — 96375 TX/PRO/DX INJ NEW DRUG ADDON: CPT

## 2024-08-19 PROCEDURE — 77417 THER RADIOLOGY PORT IMAGE(S): CPT | Performed by: RADIOLOGY

## 2024-08-19 PROCEDURE — 96374 THER/PROPH/DIAG INJ IV PUSH: CPT

## 2024-08-19 PROCEDURE — 1200000000 HC SEMI PRIVATE

## 2024-08-19 PROCEDURE — 99285 EMERGENCY DEPT VISIT HI MDM: CPT

## 2024-08-19 PROCEDURE — 6360000002 HC RX W HCPCS: Performed by: STUDENT IN AN ORGANIZED HEALTH CARE EDUCATION/TRAINING PROGRAM

## 2024-08-19 PROCEDURE — 6370000000 HC RX 637 (ALT 250 FOR IP): Performed by: STUDENT IN AN ORGANIZED HEALTH CARE EDUCATION/TRAINING PROGRAM

## 2024-08-19 PROCEDURE — 77412 RADIATION TX DELIVERY LVL 3: CPT | Performed by: RADIOLOGY

## 2024-08-19 PROCEDURE — 85025 COMPLETE CBC W/AUTO DIFF WBC: CPT

## 2024-08-19 PROCEDURE — 80053 COMPREHEN METABOLIC PANEL: CPT

## 2024-08-19 RX ORDER — ONDANSETRON 2 MG/ML
4 INJECTION INTRAMUSCULAR; INTRAVENOUS EVERY 6 HOURS PRN
Status: DISCONTINUED | OUTPATIENT
Start: 2024-08-19 | End: 2024-08-22 | Stop reason: HOSPADM

## 2024-08-19 RX ORDER — PROCHLORPERAZINE EDISYLATE 5 MG/ML
10 INJECTION INTRAMUSCULAR; INTRAVENOUS ONCE
Status: COMPLETED | OUTPATIENT
Start: 2024-08-19 | End: 2024-08-19

## 2024-08-19 RX ORDER — SODIUM CHLORIDE 9 MG/ML
INJECTION, SOLUTION INTRAVENOUS PRN
Status: DISCONTINUED | OUTPATIENT
Start: 2024-08-19 | End: 2024-08-22 | Stop reason: HOSPADM

## 2024-08-19 RX ORDER — HYDROCODONE BITARTRATE AND ACETAMINOPHEN 5; 325 MG/1; MG/1
1 TABLET ORAL ONCE
Status: COMPLETED | OUTPATIENT
Start: 2024-08-19 | End: 2024-08-19

## 2024-08-19 RX ORDER — DIAZEPAM 5 MG
5 TABLET ORAL ONCE
Status: COMPLETED | OUTPATIENT
Start: 2024-08-19 | End: 2024-08-20

## 2024-08-19 RX ORDER — SODIUM CHLORIDE 0.9 % (FLUSH) 0.9 %
5-40 SYRINGE (ML) INJECTION PRN
Status: DISCONTINUED | OUTPATIENT
Start: 2024-08-19 | End: 2024-08-22 | Stop reason: HOSPADM

## 2024-08-19 RX ORDER — DIPHENHYDRAMINE HYDROCHLORIDE 50 MG/ML
25 INJECTION INTRAMUSCULAR; INTRAVENOUS ONCE
Status: COMPLETED | OUTPATIENT
Start: 2024-08-19 | End: 2024-08-19

## 2024-08-19 RX ORDER — ACETAMINOPHEN 325 MG/1
650 TABLET ORAL EVERY 4 HOURS PRN
Status: DISCONTINUED | OUTPATIENT
Start: 2024-08-19 | End: 2024-08-20 | Stop reason: ALTCHOICE

## 2024-08-19 RX ORDER — SODIUM CHLORIDE 0.9 % (FLUSH) 0.9 %
5-40 SYRINGE (ML) INJECTION EVERY 12 HOURS SCHEDULED
Status: DISCONTINUED | OUTPATIENT
Start: 2024-08-19 | End: 2024-08-22 | Stop reason: HOSPADM

## 2024-08-19 RX ORDER — ONDANSETRON 4 MG/1
4 TABLET, ORALLY DISINTEGRATING ORAL EVERY 8 HOURS PRN
Status: DISCONTINUED | OUTPATIENT
Start: 2024-08-19 | End: 2024-08-22 | Stop reason: HOSPADM

## 2024-08-19 RX ADMIN — PROCHLORPERAZINE EDISYLATE 10 MG: 5 INJECTION INTRAMUSCULAR; INTRAVENOUS at 22:04

## 2024-08-19 RX ADMIN — DIPHENHYDRAMINE HYDROCHLORIDE 25 MG: 50 INJECTION INTRAMUSCULAR; INTRAVENOUS at 22:04

## 2024-08-19 RX ADMIN — HYDROCODONE BITARTRATE AND ACETAMINOPHEN 1 TABLET: 5; 325 TABLET ORAL at 22:04

## 2024-08-19 ASSESSMENT — PAIN DESCRIPTION - LOCATION: LOCATION: HEAD

## 2024-08-19 ASSESSMENT — PAIN DESCRIPTION - DESCRIPTORS: DESCRIPTORS: DISCOMFORT;THROBBING

## 2024-08-19 ASSESSMENT — PAIN - FUNCTIONAL ASSESSMENT: PAIN_FUNCTIONAL_ASSESSMENT: NONE - DENIES PAIN

## 2024-08-20 ENCOUNTER — HOSPITAL ENCOUNTER (OUTPATIENT)
Dept: RADIATION ONCOLOGY | Age: 54
Discharge: HOME | End: 2024-08-20
Payer: COMMERCIAL

## 2024-08-20 LAB
ALBUMIN SERPL-MCNC: 3.4 G/DL (ref 3.5–5.2)
ALP SERPL-CCNC: 71 U/L (ref 35–104)
ALT SERPL-CCNC: 18 U/L (ref 0–32)
ANION GAP SERPL CALCULATED.3IONS-SCNC: 11 MMOL/L (ref 7–16)
AST SERPL-CCNC: 15 U/L (ref 0–31)
BASOPHILS # BLD: 0.04 K/UL (ref 0–0.2)
BASOPHILS NFR BLD: 1 % (ref 0–2)
BILIRUB SERPL-MCNC: 0.4 MG/DL (ref 0–1.2)
BUN SERPL-MCNC: 18 MG/DL (ref 6–20)
CALCIUM SERPL-MCNC: 8.9 MG/DL (ref 8.6–10.2)
CHLORIDE SERPL-SCNC: 103 MMOL/L (ref 98–107)
CO2 SERPL-SCNC: 23 MMOL/L (ref 22–29)
CREAT SERPL-MCNC: 0.6 MG/DL (ref 0.5–1)
EKG ATRIAL RATE: 70 BPM
EKG P AXIS: 63 DEGREES
EKG P-R INTERVAL: 152 MS
EKG Q-T INTERVAL: 402 MS
EKG QRS DURATION: 102 MS
EKG QTC CALCULATION (BAZETT): 434 MS
EKG R AXIS: 67 DEGREES
EKG T AXIS: 8 DEGREES
EKG VENTRICULAR RATE: 70 BPM
EOSINOPHIL # BLD: 0.04 K/UL (ref 0.05–0.5)
EOSINOPHILS RELATIVE PERCENT: 1 % (ref 0–6)
ERYTHROCYTE [DISTWIDTH] IN BLOOD BY AUTOMATED COUNT: 13.4 % (ref 11.5–15)
GFR, ESTIMATED: >90 ML/MIN/1.73M2
GLUCOSE SERPL-MCNC: 185 MG/DL (ref 74–99)
HCT VFR BLD AUTO: 49.9 % (ref 34–48)
HGB BLD-MCNC: 16.7 G/DL (ref 11.5–15.5)
IMM GRANULOCYTES # BLD AUTO: 0.11 K/UL (ref 0–0.58)
IMM GRANULOCYTES NFR BLD: 1 % (ref 0–5)
INR PPP: 1
LYMPHOCYTES NFR BLD: 1.67 K/UL (ref 1.5–4)
LYMPHOCYTES RELATIVE PERCENT: 20 % (ref 20–42)
MCH RBC QN AUTO: 30.3 PG (ref 26–35)
MCHC RBC AUTO-ENTMCNC: 33.5 G/DL (ref 32–34.5)
MCV RBC AUTO: 90.6 FL (ref 80–99.9)
MONOCYTES NFR BLD: 0.58 K/UL (ref 0.1–0.95)
MONOCYTES NFR BLD: 7 % (ref 2–12)
NEUTROPHILS NFR BLD: 71 % (ref 43–80)
NEUTS SEG NFR BLD: 5.93 K/UL (ref 1.8–7.3)
PLATELET # BLD AUTO: 151 K/UL (ref 130–450)
PMV BLD AUTO: 9.8 FL (ref 7–12)
POTASSIUM SERPL-SCNC: 3.9 MMOL/L (ref 3.5–5)
PROT SERPL-MCNC: 5.8 G/DL (ref 6.4–8.3)
PROTHROMBIN TIME: 10.8 SEC (ref 9.3–12.4)
RBC # BLD AUTO: 5.51 M/UL (ref 3.5–5.5)
SODIUM SERPL-SCNC: 137 MMOL/L (ref 132–146)
WBC OTHER # BLD: 8.4 K/UL (ref 4.5–11.5)

## 2024-08-20 PROCEDURE — 80053 COMPREHEN METABOLIC PANEL: CPT

## 2024-08-20 PROCEDURE — 93010 ELECTROCARDIOGRAM REPORT: CPT | Performed by: INTERNAL MEDICINE

## 2024-08-20 PROCEDURE — 2580000003 HC RX 258: Performed by: INTERNAL MEDICINE

## 2024-08-20 PROCEDURE — 36415 COLL VENOUS BLD VENIPUNCTURE: CPT

## 2024-08-20 PROCEDURE — 85610 PROTHROMBIN TIME: CPT

## 2024-08-20 PROCEDURE — 6360000002 HC RX W HCPCS: Performed by: INTERNAL MEDICINE

## 2024-08-20 PROCEDURE — 6370000000 HC RX 637 (ALT 250 FOR IP): Performed by: INTERNAL MEDICINE

## 2024-08-20 PROCEDURE — 6370000000 HC RX 637 (ALT 250 FOR IP): Performed by: STUDENT IN AN ORGANIZED HEALTH CARE EDUCATION/TRAINING PROGRAM

## 2024-08-20 PROCEDURE — 1200000000 HC SEMI PRIVATE

## 2024-08-20 PROCEDURE — 77412 RADIATION TX DELIVERY LVL 3: CPT | Performed by: RADIOLOGY

## 2024-08-20 PROCEDURE — 85025 COMPLETE CBC W/AUTO DIFF WBC: CPT

## 2024-08-20 RX ORDER — DULOXETIN HYDROCHLORIDE 60 MG/1
60 CAPSULE, DELAYED RELEASE ORAL DAILY
Status: DISCONTINUED | OUTPATIENT
Start: 2024-08-20 | End: 2024-08-22 | Stop reason: HOSPADM

## 2024-08-20 RX ORDER — POLYETHYLENE GLYCOL 3350 17 G/17G
17 POWDER, FOR SOLUTION ORAL DAILY PRN
Status: DISCONTINUED | OUTPATIENT
Start: 2024-08-20 | End: 2024-08-22 | Stop reason: HOSPADM

## 2024-08-20 RX ORDER — SODIUM CHLORIDE 9 MG/ML
INJECTION, SOLUTION INTRAVENOUS PRN
Status: DISCONTINUED | OUTPATIENT
Start: 2024-08-20 | End: 2024-08-22 | Stop reason: HOSPADM

## 2024-08-20 RX ORDER — POTASSIUM CHLORIDE 1500 MG/1
40 TABLET, EXTENDED RELEASE ORAL PRN
Status: DISCONTINUED | OUTPATIENT
Start: 2024-08-20 | End: 2024-08-22 | Stop reason: HOSPADM

## 2024-08-20 RX ORDER — POTASSIUM CHLORIDE 7.45 MG/ML
10 INJECTION INTRAVENOUS PRN
Status: DISCONTINUED | OUTPATIENT
Start: 2024-08-20 | End: 2024-08-22 | Stop reason: HOSPADM

## 2024-08-20 RX ORDER — DEXAMETHASONE 4 MG/1
2 TABLET ORAL 2 TIMES DAILY WITH MEALS
Status: DISCONTINUED | OUTPATIENT
Start: 2024-08-20 | End: 2024-08-22 | Stop reason: HOSPADM

## 2024-08-20 RX ORDER — MAGNESIUM SULFATE IN WATER 40 MG/ML
2000 INJECTION, SOLUTION INTRAVENOUS PRN
Status: DISCONTINUED | OUTPATIENT
Start: 2024-08-20 | End: 2024-08-22 | Stop reason: HOSPADM

## 2024-08-20 RX ORDER — DEXTROAMPHETAMINE SACCHARATE, AMPHETAMINE ASPARTATE, DEXTROAMPHETAMINE SULFATE AND AMPHETAMINE SULFATE 2.5; 2.5; 2.5; 2.5 MG/1; MG/1; MG/1; MG/1
30 TABLET ORAL 2 TIMES DAILY
Status: DISCONTINUED | OUTPATIENT
Start: 2024-08-20 | End: 2024-08-22 | Stop reason: HOSPADM

## 2024-08-20 RX ORDER — AMLODIPINE BESYLATE 5 MG/1
5 TABLET ORAL DAILY
Status: DISCONTINUED | OUTPATIENT
Start: 2024-08-20 | End: 2024-08-22 | Stop reason: HOSPADM

## 2024-08-20 RX ORDER — ACETAMINOPHEN 325 MG/1
650 TABLET ORAL EVERY 6 HOURS PRN
Status: DISCONTINUED | OUTPATIENT
Start: 2024-08-20 | End: 2024-08-22 | Stop reason: HOSPADM

## 2024-08-20 RX ORDER — SODIUM CHLORIDE 0.9 % (FLUSH) 0.9 %
5-40 SYRINGE (ML) INJECTION PRN
Status: DISCONTINUED | OUTPATIENT
Start: 2024-08-20 | End: 2024-08-22 | Stop reason: HOSPADM

## 2024-08-20 RX ORDER — ENOXAPARIN SODIUM 100 MG/ML
40 INJECTION SUBCUTANEOUS DAILY
Status: DISCONTINUED | OUTPATIENT
Start: 2024-08-20 | End: 2024-08-22 | Stop reason: HOSPADM

## 2024-08-20 RX ORDER — ACETAMINOPHEN 650 MG/1
650 SUPPOSITORY RECTAL EVERY 6 HOURS PRN
Status: DISCONTINUED | OUTPATIENT
Start: 2024-08-20 | End: 2024-08-22 | Stop reason: HOSPADM

## 2024-08-20 RX ORDER — SODIUM CHLORIDE 0.9 % (FLUSH) 0.9 %
5-40 SYRINGE (ML) INJECTION EVERY 12 HOURS SCHEDULED
Status: DISCONTINUED | OUTPATIENT
Start: 2024-08-20 | End: 2024-08-22 | Stop reason: HOSPADM

## 2024-08-20 RX ORDER — DEXTROAMPHETAMINE SACCHARATE, AMPHETAMINE ASPARTATE, DEXTROAMPHETAMINE SULFATE AND AMPHETAMINE SULFATE 7.5; 7.5; 7.5; 7.5 MG/1; MG/1; MG/1; MG/1
30 TABLET ORAL 2 TIMES DAILY
Status: DISCONTINUED | OUTPATIENT
Start: 2024-08-20 | End: 2024-08-20 | Stop reason: RX

## 2024-08-20 RX ADMIN — SODIUM CHLORIDE, PRESERVATIVE FREE 10 ML: 5 INJECTION INTRAVENOUS at 00:49

## 2024-08-20 RX ADMIN — AMLODIPINE BESYLATE 5 MG: 5 TABLET ORAL at 08:45

## 2024-08-20 RX ADMIN — ACETAMINOPHEN 650 MG: 325 TABLET ORAL at 17:47

## 2024-08-20 RX ADMIN — DULOXETINE HYDROCHLORIDE 60 MG: 60 CAPSULE, DELAYED RELEASE ORAL at 08:45

## 2024-08-20 RX ADMIN — DIAZEPAM 5 MG: 5 TABLET ORAL at 00:49

## 2024-08-20 RX ADMIN — ACETAMINOPHEN 650 MG: 325 TABLET ORAL at 08:44

## 2024-08-20 RX ADMIN — DEXAMETHASONE 2 MG: 4 TABLET ORAL at 08:45

## 2024-08-20 RX ADMIN — ONDANSETRON 4 MG: 2 INJECTION INTRAMUSCULAR; INTRAVENOUS at 08:46

## 2024-08-20 RX ADMIN — SODIUM CHLORIDE, PRESERVATIVE FREE 10 ML: 5 INJECTION INTRAVENOUS at 08:48

## 2024-08-20 RX ADMIN — DEXAMETHASONE 2 MG: 4 TABLET ORAL at 17:46

## 2024-08-20 ASSESSMENT — PAIN DESCRIPTION - LOCATION
LOCATION: HEAD

## 2024-08-20 ASSESSMENT — PAIN SCALES - GENERAL
PAINLEVEL_OUTOF10: 0
PAINLEVEL_OUTOF10: 0
PAINLEVEL_OUTOF10: 7
PAINLEVEL_OUTOF10: 0
PAINLEVEL_OUTOF10: 6
PAINLEVEL_OUTOF10: 0
PAINLEVEL_OUTOF10: 6
PAINLEVEL_OUTOF10: 0

## 2024-08-20 ASSESSMENT — PAIN DESCRIPTION - ONSET: ONSET: ON-GOING

## 2024-08-20 ASSESSMENT — PAIN DESCRIPTION - ORIENTATION
ORIENTATION: RIGHT;LEFT;UPPER
ORIENTATION: RIGHT;LEFT;UPPER
ORIENTATION: RIGHT;LEFT

## 2024-08-20 ASSESSMENT — PAIN DESCRIPTION - DESCRIPTORS
DESCRIPTORS: ACHING;DISCOMFORT;THROBBING
DESCRIPTORS: ACHING;DISCOMFORT;GNAWING
DESCRIPTORS: ACHING;DISCOMFORT;THROBBING

## 2024-08-20 ASSESSMENT — PAIN - FUNCTIONAL ASSESSMENT: PAIN_FUNCTIONAL_ASSESSMENT: ACTIVITIES ARE NOT PREVENTED

## 2024-08-20 ASSESSMENT — PAIN DESCRIPTION - PAIN TYPE: TYPE: ACUTE PAIN

## 2024-08-20 ASSESSMENT — PAIN DESCRIPTION - FREQUENCY: FREQUENCY: CONTINUOUS

## 2024-08-20 NOTE — PROGRESS NOTES
Spoke to Anna Razo, NP with Hem/Onc regarding biopsy.  Patient can eat today and she will speak to physician regarding orders for biopsy.

## 2024-08-20 NOTE — ACP (ADVANCE CARE PLANNING)
Advance Care Planning   The patient has the following advanced directives on file:  Advance Directives       Power of  Living Will ACP-Advance Directive ACP-Power of     Not on File Filed on 08/14/24 Filed Not on File            The patient has appointed the following active healthcare agents:    Primary Decision Maker: Jeremiah Bajwa - Brother/Sister - [phone]    Primary Decision Maker: peyton bajwa - Brother/Sister - [phone]    Secondary Decision Maker: Gaby Lockett - Yovanny - [phone]      RAUDEL Ag  8/20/2024

## 2024-08-20 NOTE — PROGRESS NOTES
4 Eyes Skin Assessment     NAME:  Linh Parrish  YOB: 1970  MEDICAL RECORD NUMBER:  [id number]    The patient is being assessed for  Admission    I agree that at least one RN has performed a thorough Head to Toe Skin Assessment on the patient. ALL assessment sites listed below have been assessed.      Areas assessed by both nurses:    Head, Face, Ears, Shoulders, Back, Chest, Arms, Elbows, Hands, Sacrum. Buttock, Coccyx, Ischium, Legs. Feet and Heels, and Under Medical Devices         Does the Patient have a Wound? No noted wound(s)       Jerald Prevention initiated by RN: No  Wound Care Orders initiated by RN: No    Pressure Injury (Stage 3,4, Unstageable, DTI, NWPT, and Complex wounds) if present, place Wound referral order by RN under : No    New Ostomies, if present place, Ostomy referral order under : No     Nurse 1 eSignature: Electronically signed by Dominic Thomas RN on 8/20/24 at 12:19 PM EDT    **SHARE this note so that the co-signing nurse can place an eSignature**    Nurse 2 eSignature: Electronically signed by Liz Seay RN on 8/20/24 at 2:38 PM EDT

## 2024-08-20 NOTE — CONSULTS
[facility]  Hematology/Oncology  Consult      Patient Name: Linh Parrish  YOB: 1970  PCP: Milo Liao DO   Referring Provider:      Reason for Consultation:   Chief Complaint   Patient presents with    Dr Torre wanted pt admitted for biopsy        History of Present Illness: This is a 54-year-old female patient who was recently seen by our service when she hospitalized earlier this month. She has a history of stage IIc, T4b N0 melanoma on left shoulder s/p wide local excision and sentinel lymph node dissection in 2021 and was found to have widespread brain lung and adrenal metastasis .No surgical intervention planned. Radiation oncology following and patient is undergoing palliative WBRT. She is s/p IR guided biopsy of large pelvic lesion with pathology showed Smooth muscle consistent with leiomyoma.  She was discharged with needs for a PET CT. Because of inconclusive pathology, patient presented back to the ED for repeat pelvic mass biopsy. Once final diagnosis plans for likely Opdivo/Yervoy.  IR consulted for biopsy.  Radiation oncology consulted for WBRT.  CMP and LFTs WNL.  CBC with Hgb 16.7, HCT 49.9.  Consultation for patient with need for pelvic mass biopsy.     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 Price at [facility]       Patient Active Problem List    Diagnosis Date Noted    Pelvic mass 08/19/2024    Metastasis to brain (HCC) 08/07/2024    Brain mass 08/06/2024        Past Surgical History:   Procedure Laterality Date    CT BIOPSY ABDOMEN RETROPERITONEUM  8/9/2024    CT BIOPSY ABDOMEN RETROPERITONEUM 8/9/2024 Luis, Jeni Kern MD [facility] CT    MALIGNANT SKIN LESION EXCISION  06/01/2021    [facility] Dr. Price.. also lymph node removal from left axilla.    UMBILICAL HERNIA  possible heterogeneous mass measuring 1.3 x 1.3 cm. There is coarse calcification in the posterior-lateral left inferior cerebellar hemisphere with a small amount of edema around it.  This could be an additional metastatic lesion, possibly a treated metastatic lesion. The patient has a history of melanoma.  It is possible that these lesions are metastatic melanoma, but other considerations include breast, lung, kidney, and thyroid which produce slightly high-density metastatic lesions. ORBITS: The visualized portion of the orbits demonstrate no acute abnormality. SINUSES: The visualized paranasal sinuses and mastoid air cells demonstrate no acute abnormality. SOFT TISSUES/SKULL:  No acute abnormality of the visualized skull or soft tissues. I discussed the findings with Dr. Quiles at 8:04 p.m. on 08/04/2024.     1. Multiple areas of vasogenic edema are representative of metastatic disease. The most prominent region is in the right inferior cerebellum anteriorly, with edema extending into the right kehinde. This is associated with moderate compression of the 4th ventricle, moderately displaced towards the left.  This is associated with mild bilateral lateral ventricular and 3rd ventricular dilatation, with dilatation of the temporal horns and mild subependymal flow of CSF. 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 2. Additional vasogenic edema is seen in the medial right occipital lobe, the right mid parietal lobe where there is a possible subtle slightly high density mass measuring 2.0 x 1.4 cm, and the medial posterior left occipital lobe where there is a possible heterogeneous mass measuring 1.3 x 1.3 cm. 3. Coarse calcification in the posterior-lateral left inferior cerebellar hemisphere with a small amount of edema around it. This could be an additional metastatic lesion, possibly a treated metastatic lesion. 4. The patient has a history of melanoma. It is possible that these

## 2024-08-20 NOTE — PROGRESS NOTES
Discussed patient and IR procedure with bedside RN, all questions answered. Will call tomorrow when INR is resulted, WNL and IR is able to send for patient.

## 2024-08-20 NOTE — PLAN OF CARE
Problem: Pain  Goal: Verbalizes/displays adequate comfort level or baseline comfort level  8/20/2024 1215 by Dominic Thomas, RN  Outcome: Progressing

## 2024-08-20 NOTE — CARE COORDINATION
Brother/Sister - [phone]    Secondary Decision Maker: Gaby Lockett - Other - [phone]    Discharge Planning:    Patient lives with: Other (Comment) (lives with brother) Type of Home: House  Primary Care Giver: Self  Patient Support Systems include: Family Members   Current Financial resources:    Current community resources:    Current services prior to admission: None            Current DME:              Type of Home Care services:  None    ADLS  Prior functional level: Independent in ADLs/IADLs  Current functional level: Independent in ADLs/IADLs    PT AM-PAC:   /24  OT AM-PAC:   /24    Family can provide assistance at DC: Yes  Would you like Case Management to discuss the discharge plan with any other family members/significant others, and if so, who? No  Plans to Return to Present Housing: Yes  Other Identified Issues/Barriers to RETURNING to current housing:   Potential Assistance needed at discharge: N/A            Potential DME:    Patient expects to discharge to: House  Plan for transportation at discharge:      Financial    Payor: [employer] / Plan: [employer] - OPEN ACCESS (HMO) / Product Type: *No Product type* /     Does insurance require precert for SNF: Yes    Potential assistance Purchasing Medications: No  Meds-to-Beds request:        [id number] - [place], [place] - [address] - P [phone] - F [phone]  [address] [address]  Phone: [phone] Fax: [phone]      Notes:    Factors facilitating achievement of predicted outcomes: Family support, Motivated, Cooperative, and Pleasant    Barriers to discharge: Long standing deficits    Additional Case Management Notes:     The Plan for Transition of Care is related to the following treatment goals of Pelvic mass [R19.00]  Metastasis to brain (HCC) [C79.31]    IF APPLICABLE: The Patient and/or patient representative Linh and her family were provided with a choice of provider and agrees with the  discharge plan. Freedom of choice list with basic dialogue that supports the patient's individualized plan of care/goals and shares the quality data associated with the providers was provided to:     Patient Representative Name:       The Patient and/or Patient Representative Agree with the Discharge Plan     RAUDEL Ag  Case Management Department  Ph: [phone]

## 2024-08-20 NOTE — ED PROVIDER NOTES
Name: Linh Parrish    MRN: [id number]     Date / Time Roomed:  8/19/2024  7:46 PM  ED Bed Assignment:  38/38    ------------------ History of Present Illness --------------------  8/19/24, Time: 9:23 PM EDT   Chief Complaint   Patient presents with    Dr Yelitza morales pt admitted for biopsy      HPI    Linh Parrish is a 54 y.o. female, with hx of ADD, anxiety, melanoma in situ of back, brain metastases, pelvic mass (recent biopsy on 8/9/2024),, who presents to the ED today per recommendation of her oncologist, Dr. Nunez.  She is here with her brother today who is primary caregiver of her and power of .  They state that she has recently diagnosed abdominal mass with evidence of metastasis to the lungs and brain.  She just had her first radiation therapy treatment today for her brain mets.  She is not currently on chemotherapy.  She did have a recent biopsy done about 10 days ago however was inconclusive and she received a call from her cancer doctor today and recommended to come to the ER and wants an immediate biopsy of her abdominal mass in order to help guide initiation of chemotherapy treatment.  Patient is on palliative care.  She states she has had some headache however this is chronic for her due to her brain mets.  She states that sometimes she does get nauseated however has had no nausea today.  She rates her headache 6 out of 10 currently.  Denies any weakness or vision changes.  No current nausea.  The pt denies other ROS at this time.     PCP: Milo Liao DO.    -------------------- PM --------------------    Past Medical History:   has a past medical history of Attention deficit disorder (ADD) without hyperactivity, Depression, HETAL (generalized anxiety disorder), and Melanoma in situ of back (HCC) (06/01/2021).     Surgical History:  Past Surgical History:   Procedure Laterality Date    CT BIOPSY ABDOMEN RETROPERITONEUM  8/9/2024    CT BIOPSY ABDOMEN RETROPERITONEUM 8/9/2024

## 2024-08-20 NOTE — H&P
[facility]              [address] [id number]                           HISTORY & PHYSICAL      PATIENT NAME: KIMBERLY GALE              : 1970  MED REC NO: [id number]                        ROOM: 8421  ACCOUNT NO: [id number]                       ADMIT DATE: 2024  PROVIDER: Pavan Mckeon DO      CHIEF COMPLAINT:  Pelvic mass.    HISTORY OF PRESENT ILLNESS:  The patient is a 54-year-old  female who was recently admitted to the hospital for evaluation of brain mass, also found to have a pelvic mass, at which time, pelvic mass was biopsied.  The patient was seen by Oncology as a postop followup and biopsy results evidently inconclusive.  The patient was sent back to the emergency room by Oncology to be admitted for a repeat pelvic mass biopsy.  The patient just started radiation therapy to brain.    PAST MEDICAL HISTORY:  ADD, depression, hypertension, and malignant melanoma of back.    PAST SURGICAL HISTORY:  Umbilical hernia repair, malignant melanoma lesion removal of back in , tonsillectomy, and wisdom teeth extraction.    SOCIAL HISTORY:  The patient quit tobacco several days ago.  No alcohol.    MEDICATIONS PRIOR TO ADMISSION:  Amlodipine, Adderall, Decadron, Cymbalta, and multivitamin.    REVIEW OF SYSTEMS:  Remarkable for above-stated chief complaint.    ALLERGIES:  NONE KNOWN.      PHYSICAL EXAMINATION:  GENERAL APPEARANCE:  Reveals a 54-year-old  female who is alert, cooperative, and a fair historian.  VITAL SIGNS:  On admission, temperature 98 degrees, pulse 92, respirations 18, and blood pressure 138/89.  HEAD:  Normocephalic and atraumatic.  EYES:  Pupils equal and reactive to light.  Extraocular muscles intact.  Fundi not well visualized.  NOSE:  No obstruction, polyp, or discharge noted.  MOUTH:  Mucosa without lesion.  PHARYNX:  Noninjected without exudate.  NECK:  Supple.  No JVD.  No  thyromegaly.  No carotid bruits.  HEART:  Regular rate and rhythm without murmur.  LUNGS:  Clear to auscultation bilaterally.  ABDOMEN:  Positive bowel sounds.  Soft and nontender.  No rebound.  No guarding.  No hepatosplenomegaly.  No masses.  BACK:  Intact without lesion.  EXTREMITIES:  Without edema.  LYMPH NODES:  No adenopathy noted.  SKIN:  Without rash or lesion.    IMPRESSION:    1. Pelvic mass.  2. Brain mass.  3. History of malignant melanoma.  4. History of attention deficit disorder/depression.    PLAN:    1. Admit.  2. IR to see.  3. Oncology to see.  4. Radiation Oncology to see.  5. Discharge plan, home when stable.          KRISTINA HEBERT DO      D:  08/20/2024 12:46:37     T:  08/20/2024 13:14:43     BURT/DONNY  Job #:  [id number]     Doc#:  [id number]

## 2024-08-21 ENCOUNTER — APPOINTMENT (OUTPATIENT)
Dept: GENERAL RADIOLOGY | Age: 54
DRG: 392 | End: 2024-08-21
Payer: COMMERCIAL

## 2024-08-21 ENCOUNTER — HOSPITAL ENCOUNTER (OUTPATIENT)
Dept: RADIATION ONCOLOGY | Age: 54
Discharge: HOME | End: 2024-08-21
Payer: COMMERCIAL

## 2024-08-21 ENCOUNTER — APPOINTMENT (OUTPATIENT)
Dept: CT IMAGING | Age: 54
DRG: 392 | End: 2024-08-21
Payer: COMMERCIAL

## 2024-08-21 PROCEDURE — 71045 X-RAY EXAM CHEST 1 VIEW: CPT

## 2024-08-21 PROCEDURE — 77412 RADIATION TX DELIVERY LVL 3: CPT | Performed by: RADIOLOGY

## 2024-08-21 PROCEDURE — 6360000002 HC RX W HCPCS: Performed by: RADIOLOGY

## 2024-08-21 PROCEDURE — 0BBF3ZX EXCISION OF RIGHT LOWER LUNG LOBE, PERCUTANEOUS APPROACH, DIAGNOSTIC: ICD-10-PCS | Performed by: RADIOLOGY

## 2024-08-21 PROCEDURE — 6360000002 HC RX W HCPCS: Performed by: INTERNAL MEDICINE

## 2024-08-21 PROCEDURE — 2709999900 CT NEEDLE BIOPSY LUNG PERCUTANEOUS W IMAGING GUIDANCE

## 2024-08-21 PROCEDURE — 1200000000 HC SEMI PRIVATE

## 2024-08-21 PROCEDURE — 32408 CORE NDL BX LNG/MED PERQ: CPT

## 2024-08-21 PROCEDURE — 6370000000 HC RX 637 (ALT 250 FOR IP): Performed by: INTERNAL MEDICINE

## 2024-08-21 PROCEDURE — 2580000003 HC RX 258: Performed by: INTERNAL MEDICINE

## 2024-08-21 RX ORDER — DIPHENHYDRAMINE HYDROCHLORIDE 50 MG/ML
INJECTION INTRAMUSCULAR; INTRAVENOUS PRN
Status: COMPLETED | OUTPATIENT
Start: 2024-08-21 | End: 2024-08-21

## 2024-08-21 RX ORDER — MIDAZOLAM HYDROCHLORIDE 2 MG/2ML
INJECTION, SOLUTION INTRAMUSCULAR; INTRAVENOUS PRN
Status: COMPLETED | OUTPATIENT
Start: 2024-08-21 | End: 2024-08-21

## 2024-08-21 RX ORDER — FENTANYL CITRATE 50 UG/ML
INJECTION, SOLUTION INTRAMUSCULAR; INTRAVENOUS PRN
Status: COMPLETED | OUTPATIENT
Start: 2024-08-21 | End: 2024-08-21

## 2024-08-21 RX ORDER — HYDROCODONE BITARTRATE AND ACETAMINOPHEN 5; 325 MG/1; MG/1
1 TABLET ORAL EVERY 6 HOURS PRN
Status: DISCONTINUED | OUTPATIENT
Start: 2024-08-21 | End: 2024-08-22 | Stop reason: HOSPADM

## 2024-08-21 RX ADMIN — HYDROCODONE BITARTRATE AND ACETAMINOPHEN 1 TABLET: 5; 325 TABLET ORAL at 18:20

## 2024-08-21 RX ADMIN — DIPHENHYDRAMINE HYDROCHLORIDE 25 MG: 50 INJECTION, SOLUTION INTRAMUSCULAR; INTRAVENOUS at 09:50

## 2024-08-21 RX ADMIN — DEXTROAMPHETAMINE SACCHARATE, AMPHETAMINE ASPARTATE, DEXTROAMPHETAMINE SULFATE AND AMPHETAMINE SULFATE 30 MG: 2.5; 2.5; 2.5; 2.5 TABLET ORAL at 11:23

## 2024-08-21 RX ADMIN — ONDANSETRON 4 MG: 2 INJECTION INTRAMUSCULAR; INTRAVENOUS at 09:12

## 2024-08-21 RX ADMIN — DEXTROAMPHETAMINE SACCHARATE, AMPHETAMINE ASPARTATE, DEXTROAMPHETAMINE SULFATE AND AMPHETAMINE SULFATE 30 MG: 2.5; 2.5; 2.5; 2.5 TABLET ORAL at 17:46

## 2024-08-21 RX ADMIN — SODIUM CHLORIDE, PRESERVATIVE FREE 10 ML: 5 INJECTION INTRAVENOUS at 20:38

## 2024-08-21 RX ADMIN — DEXAMETHASONE 2 MG: 4 TABLET ORAL at 17:46

## 2024-08-21 RX ADMIN — AMLODIPINE BESYLATE 5 MG: 5 TABLET ORAL at 11:24

## 2024-08-21 RX ADMIN — DULOXETINE HYDROCHLORIDE 60 MG: 60 CAPSULE, DELAYED RELEASE ORAL at 11:24

## 2024-08-21 RX ADMIN — ONDANSETRON 4 MG: 2 INJECTION INTRAMUSCULAR; INTRAVENOUS at 03:26

## 2024-08-21 RX ADMIN — DEXAMETHASONE 2 MG: 4 TABLET ORAL at 11:23

## 2024-08-21 RX ADMIN — FENTANYL CITRATE 25 MCG: 50 INJECTION, SOLUTION INTRAMUSCULAR; INTRAVENOUS at 09:50

## 2024-08-21 RX ADMIN — MIDAZOLAM HYDROCHLORIDE 0.5 MG: 1 INJECTION, SOLUTION INTRAMUSCULAR; INTRAVENOUS at 09:50

## 2024-08-21 RX ADMIN — SODIUM CHLORIDE, PRESERVATIVE FREE 10 ML: 5 INJECTION INTRAVENOUS at 11:24

## 2024-08-21 ASSESSMENT — PAIN DESCRIPTION - DESCRIPTORS
DESCRIPTORS: ACHING;DISCOMFORT
DESCRIPTORS: ACHING;DISCOMFORT;DULL

## 2024-08-21 ASSESSMENT — PAIN SCALES - GENERAL
PAINLEVEL_OUTOF10: 6
PAINLEVEL_OUTOF10: 9

## 2024-08-21 ASSESSMENT — PAIN DESCRIPTION - LOCATION
LOCATION: HEAD
LOCATION: HEAD

## 2024-08-21 NOTE — PRE SEDATION
Sedation Pre-Procedure Note    Patient Name: Linh Parrish   YOB: 1970  Room/Bed: 8421/8421-A  Medical Record Number: [id number]  Date: 8/21/2024   Time: 10:08 AM       Indication:  Lung nodules    Consent: I have discussed with the patient and/or the patient representative the indication, alternatives, and the possible risks and/or complications of the planned procedure and the anesthesia methods. The patient and/or patient representative appear to understand and agree to proceed.    Vital Signs:   Vitals:    08/21/24 1004   BP:    Pulse: 80   Resp: 11   Temp:    SpO2: 97%       Past Medical History:   has a past medical history of Attention deficit disorder (ADD) without hyperactivity, Depression, HETAL (generalized anxiety disorder), and Melanoma in situ of back (HCC).    Past Surgical History:   has a past surgical history that includes Umbilical hernia repair; malignant skin lesion excision (06/01/2021); and CT BIOPSY ABDOMEN RETROPERITONEUM (8/9/2024).    Medications:   Scheduled Meds:    amLODIPine  5 mg Oral Daily    dexAMETHasone  2 mg Oral BID WC    DULoxetine  60 mg Oral Daily    sodium chloride flush  5-40 mL IntraVENous 2 times per day    enoxaparin  40 mg SubCUTAneous Daily    amphetamine-dextroamphetamine  30 mg Oral BID    sodium chloride flush  5-40 mL IntraVENous 2 times per day     Continuous Infusions:    sodium chloride      sodium chloride       PRN Meds: sodium chloride flush, sodium chloride, potassium chloride **OR** potassium alternative oral replacement **OR** potassium chloride, magnesium sulfate, polyethylene glycol, acetaminophen **OR** acetaminophen, sodium chloride flush, sodium chloride, ondansetron **OR** ondansetron  Home Meds:   Prior to Admission medications    Medication Sig Start Date End Date Taking? Authorizing Provider   amLODIPine (NORVASC) 5 MG tablet Take 1 tablet by mouth daily 8/11/24  Yes Matt Hurst MD   Multiple Vitamin (MVI, CELEBRATE, CHEWABLE  TABLET) Take 1 tablet by mouth daily   Yes Provider, MD Brandee   DULoxetine (CYMBALTA) 60 MG extended release capsule Take 1 capsule by mouth daily   Yes Provider, MD Brandee   amphetamine-dextroamphetamine (ADDERALL, 30MG,) 30 MG tablet Take 1 tablet by mouth 2 times daily for 30 days. Max Daily Amount: 60 mg 4/26/24 8/6/24  Missy Fuchs DO     Coumadin Use Last 7 Days:  no  Antiplatelet drug therapy use last 7 days: no  Other anticoagulant use last 7 days: no  Additional Medication Information:  N/A      Pre-Sedation Documentation and Exam:   I have reviewed the patient's history and review of systems.    Mallampati Airway Assessment:  Mallampati Class II - (soft palate, fauces & uvula are visible)    Prior History of Anesthesia Complications:   none    ASA Classification:  Class 3 - A patient with severe systemic disease that limits activity but is not incapacitating    Sedation/ Anesthesia Plan:   intravenous sedation    Medications Planned:   fentanyl intravenously    Patient is an appropriate candidate for plan of sedation: yes    Electronically signed by Jeni Smith MD on 8/21/2024 at 10:08 AM

## 2024-08-21 NOTE — PROCEDURES
PROCEDURE NOTE  Date: 8/21/2024   Name: Linh Parrish  YOB: 1970    Procedures      Patient arrived to radiology department right lung biopsy. Allergies, medications, H&P and fasting instructions reviewed. Vital signs taken. IV flushed and prn adapter attached. Procedural instructions given, questions answered, understanding expressed and consent signed. No questions at this time

## 2024-08-21 NOTE — PLAN OF CARE
Problem: Pain  Goal: Verbalizes/displays adequate comfort level or baseline comfort level  8/20/2024 2212 by Anna Adams, RN  Outcome: Progressing  8/20/2024 1215 by Dominic Thomas, RN  Outcome: Progressing

## 2024-08-21 NOTE — CARE COORDINATION
Social Work/Case Management Transition of Care Planning (Rose Marie Ferreira -[phone]):  Per report and chart review, plan is for lung biopsy today.  Hem/Onc is following.  Discharge plan is to home with no needs. Family will transport. CM/SW will follow. Rose Marie Ferreira, LSW  8/21/2024

## 2024-08-21 NOTE — PLAN OF CARE
Problem: Pain  Goal: Verbalizes/displays adequate comfort level or baseline comfort level  8/21/2024 1146 by Rohini Smith RN  Outcome: Progressing     Problem: Discharge Planning  Goal: Discharge to home or other facility with appropriate resources  Outcome: Progressing

## 2024-08-21 NOTE — PROGRESS NOTES
[facility] [place]  Hematology/Oncology  Consult      Patient Name: Linh Parrish  YOB: 1970  PCP: Milo Liao DO   Referring Provider:      Reason for Consultation:   Chief Complaint   Patient presents with    Dr Torre wanted pt admitted for biopsy        History of Present Illness: This is a 54-year-old female patient who was recently seen by our service when she hospitalized earlier this month. She has a history of stage IIc, T4b N0 melanoma on left shoulder s/p wide local excision and sentinel lymph node dissection in 2021 and was found to have widespread brain lung and adrenal metastasis .No surgical intervention planned. Radiation oncology following and patient is undergoing palliative WBRT. She is s/p IR guided biopsy of large pelvic lesion with pathology showed Smooth muscle consistent with leiomyoma.  She was discharged with needs for a PET CT. Because of inconclusive pathology, patient presented back to the ED for repeat pelvic mass biopsy. Once final diagnosis plans for likely Opdivo/Yervoy.  IR consulted for biopsy.  Radiation oncology consulted for WBRT.  CMP and LFTs WNL.  CBC with Hgb 16.7, HCT 49.9.  Consultation for patient with need for pelvic mass biopsy.     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 Price at [facility]       Patient Active Problem List    Diagnosis Date Noted    Pelvic mass 08/19/2024    Metastasis to brain (HCC) 08/07/2024    Brain mass 08/06/2024        Past Surgical History:   Procedure Laterality Date    CT BIOPSY ABDOMEN RETROPERITONEUM  8/9/2024    CT BIOPSY ABDOMEN RETROPERITONEUM 8/9/2024 Jeni Smith MD [facility]    CT NEEDLE BIOPSY LUNG PERCUTANEOUS  8/21/2024    CT NEEDLE BIOPSY LUNG PERCUTANEOUS 8/21/2024 Jeni Smith MD [facility]     small right pleural effusion. RECOMMENDATION: Refer to the prior CT chest of 08/07/2024.     CT BIOPSY ABDOMEN RETROPERITONEUM    Result Date: 8/9/2024  PROCEDURE: CT GUIDED CORE BIOPSY MESENTERIC MASS MODERATE CONSCIOUS SEDATION 8/9/2024 HISTORY: ORDERING SYSTEM PROVIDED HISTORY: Biopsy of large abdominopelvic mass. TECHNOLOGIST PROVIDED HISTORY: Reason for exam:->Biopsy of large abdominopelvic mass. What reading provider will be dictating this exam?->CRC PHYSICIANS: MOSHE Smith SEDATION: Moderate sedation was ordered and supervised by the attending with physician face-to-face monitoring. Medications were provided and recorded by Radiology nurses.  Continued vital sign monitoring the patient was performed by the attending and independent trained observer, radiology nurse. TECHNIQUE: Informed consent was obtained after a detailed discussion about the procedure including the risk, benefits, and alternatives.  Universal protocol was followed.  Sterile gowns, masks, hats and gloves utilized for maximal sterile barrier. Axial images were obtained through the mesenteric mass and a suitable skin site was prepped and draped in sterile fashion.  Local anesthesia was achieved with lidocaine.  Core biopsy was performed with CT guidance.  Five  18 gauge core biopsy specimens were obtained using coaxial technique and the patient tolerated the procedure well. Dose modulation, iterative reconstruction, and/or weight based adjustment of the mA/kV was utilized to reduce the radiation dose to as low as reasonably achievable. FINDINGS: There is a heterogeneous pelvic mass.  There are no signs of internal calcifications with this process appears solid.  There is a small volume of fluid extending along the right margins of this process.     Successful CT-guided core biopsy of a pelvic mass.     CT ABDOMEN PELVIS W WO CONTRAST Additional Contrast? Radiologist Recommendation    Result Date: 8/7/2024  EXAMINATION: CT OF THE ABDOMEN AND  and without IV contrast and delayed washout imaging or MRI of the adrenals can be considered. 3. There is a suggestion of a faint, small, rounded hypodensity within the pancreatic head, measuring 1.2 x 1.0 x 1.0 cm in size. MRI of the abdomen with and without IV contrast can be performed for further evaluation. 4. Mild splenomegaly. 5. Colonic diverticulosis.     CT CHEST W WO CONTRAST    Result Date: 8/7/2024  EXAMINATION: CT OF THE CHEST WITH AND WITHOUT CONTRAST 8/7/2024 8:55 am TECHNIQUE: CT of the chest was performed without and with the administration of intravenous contrast. Multiplanar reformatted images are provided for review. Automated exposure control, iterative reconstruction, and/or weight based adjustment of the mA/kV was utilized to reduce the radiation dose to as low as reasonably achievable. COMPARISON: Correlation is made with chest radiographs performed 08/04/2024. HISTORY: ORDERING SYSTEM PROVIDED HISTORY: r/o primary lung mass TECHNOLOGIST PROVIDED HISTORY: Reason for exam:->r/o primary lung mass Additional Contrast?->Radiologist Recommendation What reading provider will be dictating this exam?->CRC FINDINGS: Mediastinum: Small mediastinal lymph nodes are identified in the paratracheal and aortopulmonary window spaces, measuring up to 1.8 x 1.0 cm.  Enlarged right hilar lymph nodes are noted, measuring up to 3.1 x 1.5 cm.  No axillary or left hilar lymphadenopathy is appreciated.  The thoracic aorta is normal in caliber.  The remainder of the visualized pulmonary vasculature is unremarkable.  The heart is within normal limits in size.  A small amount of fluid is noted within the superior pericardial recess.  Subcentimeter hypodensities are identified within the right and left lobes of the thyroid gland, indicating nodules. Lungs/pleura: There is no evidence of acute consolidation or infiltrate.  No active pleural disease is seen.  A right azygous lobe/fissure is noted.  As seen on the chest

## 2024-08-21 NOTE — PROGRESS NOTES
[facility] Medicine    Subjective:  pt alert conversive tentatively plan for bx today      Current Facility-Administered Medications:     amLODIPine (NORVASC) tablet 5 mg, 5 mg, Oral, Daily, Pavan Mckeon DO, 5 mg at 08/20/24 0845    dexAMETHasone (DECADRON) tablet 2 mg, 2 mg, Oral, BID WC, Pavan Mckeon DO, 2 mg at 08/20/24 1746    DULoxetine (CYMBALTA) extended release capsule 60 mg, 60 mg, Oral, Daily, Pavan Mckeon DO, 60 mg at 08/20/24 0845    sodium chloride flush 0.9 % injection 5-40 mL, 5-40 mL, IntraVENous, 2 times per day, Pavan Mckeon DO    sodium chloride flush 0.9 % injection 5-40 mL, 5-40 mL, IntraVENous, PRN, Pavan Mckeon DO    0.9 % sodium chloride infusion, , IntraVENous, PRN, Pavan Mckeon DO    potassium chloride (KLOR-CON M) extended release tablet 40 mEq, 40 mEq, Oral, PRN **OR** potassium bicarb-citric acid (EFFER-K) effervescent tablet 40 mEq, 40 mEq, Oral, PRN **OR** potassium chloride 10 mEq/100 mL IVPB (Peripheral Line), 10 mEq, IntraVENous, PRN, Pavan Mckeon DO    magnesium sulfate 2000 mg in 50 mL IVPB premix, 2,000 mg, IntraVENous, PRN, Pavan Mckeon DO    enoxaparin (LOVENOX) injection 40 mg, 40 mg, SubCUTAneous, Daily, Pavan Mckeon DO    polyethylene glycol (GLYCOLAX) packet 17 g, 17 g, Oral, Daily PRN, Pavan Mckeon DO    acetaminophen (TYLENOL) tablet 650 mg, 650 mg, Oral, Q6H PRN, 650 mg at 08/20/24 1747 **OR** acetaminophen (TYLENOL) suppository 650 mg, 650 mg, Rectal, Q6H PRN, Pavan Mckeon DO    amphetamine-dextroamphetamine (ADDERALL) tablet 30 mg, 30 mg, Oral, BID, Pavan Mckeon DO    sodium chloride flush 0.9 % injection 5-40 mL, 5-40 mL, IntraVENous, 2 times per day, Pavan Mckeon DO, 10 mL at 08/20/24 0848    sodium chloride flush 0.9 % injection 5-40 mL, 5-40 mL, IntraVENous, PRN, Pavan Mckeon,     0.9 % sodium chloride infusion, , IntraVENous, PRN, Pavan Mckeon,     ondansetron (ZOFRAN-ODT)  disintegrating tablet 4 mg, 4 mg, Oral, Q8H PRN **OR** ondansetron (ZOFRAN) injection 4 mg, 4 mg, IntraVENous, Q6H PRN, Pavan Mckeon DO, 4 mg at 08/21/24 0326    Objective:    /78   Pulse 79   Temp 97.7 °F (36.5 °C) (Temporal)   Resp 18   Ht 1.753 m (5' 9\")   Wt 59.9 kg (132 lb)   SpO2 95%   BMI 19.49 kg/m²     Heart:  reg  Lungs:  ctab  Abd: + bs soft nontender  Extrem:  w/o edema    CBC with Differential:    Lab Results   Component Value Date/Time    WBC 8.4 08/20/2024 03:35 AM    RBC 5.51 08/20/2024 03:35 AM    HGB 16.7 08/20/2024 03:35 AM    HCT 49.9 08/20/2024 03:35 AM     08/20/2024 03:35 AM    MCV 90.6 08/20/2024 03:35 AM    MCH 30.3 08/20/2024 03:35 AM    MCHC 33.5 08/20/2024 03:35 AM    RDW 13.4 08/20/2024 03:35 AM    LYMPHOPCT 20 08/20/2024 03:35 AM    MONOPCT 7 08/20/2024 03:35 AM    EOSPCT 1 08/20/2024 03:35 AM    BASOPCT 1 08/20/2024 03:35 AM    MONOSABS 0.58 08/20/2024 03:35 AM    LYMPHSABS 1.67 08/20/2024 03:35 AM    EOSABS 0.04 08/20/2024 03:35 AM    BASOSABS 0.04 08/20/2024 03:35 AM     CMP:    Lab Results   Component Value Date/Time     08/20/2024 03:35 AM    K 3.9 08/20/2024 03:35 AM    K 4.2 06/15/2019 02:14 AM     08/20/2024 03:35 AM    CO2 23 08/20/2024 03:35 AM    BUN 18 08/20/2024 03:35 AM    CREATININE 0.6 08/20/2024 03:35 AM    GFRAA >60 06/15/2019 02:14 AM    LABGLOM >90 08/20/2024 03:35 AM    GLUCOSE 185 08/20/2024 03:35 AM    CALCIUM 8.9 08/20/2024 03:35 AM    BILITOT 0.4 08/20/2024 03:35 AM    ALKPHOS 71 08/20/2024 03:35 AM    AST 15 08/20/2024 03:35 AM    ALT 18 08/20/2024 03:35 AM     Warfarin PT/INR:    Lab Results   Component Value Date    INR 1.0 08/20/2024    INR 1.1 08/08/2024    PROTIME 10.8 08/20/2024    PROTIME 11.8 08/08/2024       Assessment:    Principal Problem:    Pelvic mass  Resolved Problems:    * No resolved hospital problems. *      Plan:  For bx today        Pavan Mckeon DO  6:37 AM  8/21/2024

## 2024-08-21 NOTE — BRIEF OP NOTE
Brief Postoperative Note      Patient: Linh Parrish  YOB: 1970  MRN: [id number]    Date of Procedure: 8/21/2024    Lung nodules    Post-Op Diagnosis: Same       Ct guided lung biopsy    MOSHE Smith    Assistant:  * No surgical staff found *    Anesthesia: * Moderate sedation    Estimated Blood Loss (mL): Minimal    Complications: None    Specimens:   Right lung.    Implants:  * No implants in log *      Drains: * No LDAs found *    Findings:  Infection Present At Time Of Surgery (PATOS) (choose all levels that have infection present):  No infection present  Other Findings: Multiple pulmonary nodules    Electronically signed by Jeni Smith MD on 8/21/2024 at 10:10 AM

## 2024-08-21 NOTE — PROGRESS NOTES
Discussed patient and IR procedure with bedside RN, all questions answered. Will call when IR Dr Smith speaks with Dr Nunez and IR is able to send for patient.

## 2024-08-22 ENCOUNTER — APPOINTMENT (OUTPATIENT)
Dept: RADIATION ONCOLOGY | Age: 54
End: 2024-08-22
Payer: COMMERCIAL

## 2024-08-22 ENCOUNTER — HOSPITAL ENCOUNTER (OUTPATIENT)
Dept: RADIATION ONCOLOGY | Age: 54
Discharge: HOME | End: 2024-08-22
Payer: COMMERCIAL

## 2024-08-22 VITALS
OXYGEN SATURATION: 96 % | DIASTOLIC BLOOD PRESSURE: 95 MMHG | RESPIRATION RATE: 18 BRPM | WEIGHT: 228 LBS | TEMPERATURE: 98.7 F | BODY MASS INDEX: 33.67 KG/M2 | SYSTOLIC BLOOD PRESSURE: 169 MMHG | HEART RATE: 86 BPM

## 2024-08-22 VITALS
SYSTOLIC BLOOD PRESSURE: 155 MMHG | BODY MASS INDEX: 19.55 KG/M2 | HEIGHT: 69 IN | HEART RATE: 77 BPM | WEIGHT: 132 LBS | TEMPERATURE: 98.6 F | DIASTOLIC BLOOD PRESSURE: 102 MMHG | OXYGEN SATURATION: 98 % | RESPIRATION RATE: 16 BRPM

## 2024-08-22 PROCEDURE — 6360000002 HC RX W HCPCS: Performed by: INTERNAL MEDICINE

## 2024-08-22 PROCEDURE — 77412 RADIATION TX DELIVERY LVL 3: CPT | Performed by: RADIOLOGY

## 2024-08-22 PROCEDURE — 2580000003 HC RX 258: Performed by: INTERNAL MEDICINE

## 2024-08-22 PROCEDURE — 6370000000 HC RX 637 (ALT 250 FOR IP): Performed by: INTERNAL MEDICINE

## 2024-08-22 RX ORDER — ONDANSETRON 4 MG/1
4 TABLET, ORALLY DISINTEGRATING ORAL EVERY 8 HOURS PRN
Qty: 12 TABLET | Refills: 0 | Status: SHIPPED | OUTPATIENT
Start: 2024-08-22

## 2024-08-22 RX ORDER — DEXAMETHASONE 2 MG/1
2 TABLET ORAL 2 TIMES DAILY WITH MEALS
Qty: 20 TABLET | Refills: 0
Start: 2024-08-22 | End: 2024-09-01

## 2024-08-22 RX ADMIN — HYDROCODONE BITARTRATE AND ACETAMINOPHEN 1 TABLET: 5; 325 TABLET ORAL at 07:30

## 2024-08-22 RX ADMIN — DEXAMETHASONE 2 MG: 4 TABLET ORAL at 07:59

## 2024-08-22 RX ADMIN — SODIUM CHLORIDE, PRESERVATIVE FREE 10 ML: 5 INJECTION INTRAVENOUS at 07:59

## 2024-08-22 RX ADMIN — DULOXETINE HYDROCHLORIDE 60 MG: 60 CAPSULE, DELAYED RELEASE ORAL at 07:58

## 2024-08-22 RX ADMIN — AMLODIPINE BESYLATE 5 MG: 5 TABLET ORAL at 07:59

## 2024-08-22 RX ADMIN — DEXTROAMPHETAMINE SACCHARATE, AMPHETAMINE ASPARTATE, DEXTROAMPHETAMINE SULFATE AND AMPHETAMINE SULFATE 30 MG: 2.5; 2.5; 2.5; 2.5 TABLET ORAL at 07:58

## 2024-08-22 ASSESSMENT — PAIN DESCRIPTION - DESCRIPTORS: DESCRIPTORS: ACHING;DISCOMFORT;DULL

## 2024-08-22 ASSESSMENT — PAIN DESCRIPTION - LOCATION: LOCATION: ABDOMEN

## 2024-08-22 ASSESSMENT — PAIN DESCRIPTION - ORIENTATION: ORIENTATION: LOWER

## 2024-08-22 NOTE — PLAN OF CARE
Problem: Pain  Goal: Verbalizes/displays adequate comfort level or baseline comfort level  8/21/2024 2236 by Anna Adams, RONNY  Outcome: Progressing  Flowsheets (Taken 8/21/2024 1945)  Verbalizes/displays adequate comfort level or baseline comfort level: Encourage patient to monitor pain and request assistance  8/21/2024 1146 by Rohini Smith, RN  Outcome: Progressing     Problem: Discharge Planning  Goal: Discharge to home or other facility with appropriate resources  8/21/2024 2236 by Anna Adams, RN  Outcome: Progressing  8/21/2024 1146 by Rohini Smith, RN  Outcome: Progressing

## 2024-08-22 NOTE — PROGRESS NOTES
Radiation Oncology   On Treatment Visit  Adin Machado MD      8/22/2024  Linh Parrish  Date of Service:       HPI: The patient is currently getting whole brain radiotherapy and tolerating it very well.  She says she is more alert and can see better at this juncture.          Past Medical History:   Diagnosis Date    Attention deficit disorder (ADD) without hyperactivity     Dr Zavaleta    Depression     HETAL (generalized anxiety disorder)     Melanoma in situ of back (HCC) 06/01/2021    Dr. Price at [facility]         Past Surgical History:   Procedure Laterality Date    CT BIOPSY ABDOMEN RETROPERITONEUM  8/9/2024    CT BIOPSY ABDOMEN RETROPERITONEUM 8/9/2024 Jeni Smith MD [facility]    CT NEEDLE BIOPSY LUNG PERCUTANEOUS  8/21/2024    CT NEEDLE BIOPSY LUNG PERCUTANEOUS 8/21/2024 Jeni Smith MD [facility] CT    MALIGNANT SKIN LESION EXCISION  06/01/2021    [facility] Dr. Price.. also lymph node removal from left axilla.    UMBILICAL HERNIA REPAIR           Social History     Socioeconomic History    Marital status: Single     Spouse name: Not on file    Number of children: Not on file    Years of education: Not on file    Highest education level: Not on file   Occupational History    Occupation: [employer]     Comment: [employer]   Tobacco Use    Smoking status: Every Day     Current packs/day: 0.25     Average packs/day: 0.3 packs/day for 25.0 years (6.3 ttl pk-yrs)     Types: Cigarettes     Passive exposure: Past    Smokeless tobacco: Never   Vaping Use    Vaping status: Never Used   Substance and Sexual Activity    Alcohol use: Never    Drug use: Never    Sexual activity: Not on file   Other Topics Concern    Not on file   Social History Narrative    Not on file     Social Determinants of Health     Financial Resource Strain: Low Risk  (7/29/2024)    Overall Financial Resource Strain (CARDIA)     Difficulty of Paying Living Expenses: Not hard at all   Food Insecurity: No Food Insecurity

## 2024-08-22 NOTE — PROGRESS NOTES
Linh HEIDY Francois  8/22/2024  Wt Readings from Last 3 Encounters:   08/22/24 103.4 kg (228 lb)   08/20/24 59.9 kg (132 lb)   08/14/24 55.3 kg (122 lb)     Body mass index is 33.67 kg/m².      Treatment Area:whole brain    Patient was seen today for weekly visit.      Comfort Alteration  KPS:90%  Fatigue: Mild      CNS Alteration  Depressed Level of Consciousness:na  Orientation: time place person  Neuropathy-Motor: na  Ataxia: na  Speech Impairment: No  Seizures: No  Urinary Incontinence: No  Bowel Incontinence: No  Insomnia: No    Sensory Alteration: na    Nutritional Alteration  Anorexia: No   Nausea: No   Vomiting: No    Skin Alteration   Sensation:na    Radiation Dermatitis:  na    Alopecia: No    Emotional  Coping: effective    Injury, potential bleeding or infection: na          Lab Results   Component Value Date    GLUCOSE 185 (H) 08/20/2024       BP (!) 169/95   Pulse 86   Temp 98.7 °F (37.1 °C) (Skin)   Resp 18   Wt 103.4 kg (228 lb)   SpO2 96%   BMI 33.67 kg/m²   BP within normal range? No, patient very nauseated and upset          Assessment/Plan:4/10fx  1200/3000cGy and tolerating    Ashley Lux RN

## 2024-08-22 NOTE — CARE COORDINATION
Social Work/Case Management Transition of Care Planning (Rose Marie Ferreira -[phone]): Discharge order noted.  Patient to follow up with oncology and radiation oncology as an outpatient. Met with patient.  Discharge plan is to home with no needs.Sister will transport. Rose Marie Ferreira, RAUDEL  8/22/2024

## 2024-08-22 NOTE — PROGRESS NOTES
[facility] Medicine    Subjective:  pt alert conversive s/p lung bx yesterday      Current Facility-Administered Medications:     HYDROcodone-acetaminophen (NORCO) 5-325 MG per tablet 1 tablet, 1 tablet, Oral, Q6H PRN, Pavan Mckeon DO, 1 tablet at 08/21/24 1820    amLODIPine (NORVASC) tablet 5 mg, 5 mg, Oral, Daily, Pavan Mckeon DO, 5 mg at 08/21/24 1124    dexAMETHasone (DECADRON) tablet 2 mg, 2 mg, Oral, BID WC, Pavan Mckeon DO, 2 mg at 08/21/24 1746    DULoxetine (CYMBALTA) extended release capsule 60 mg, 60 mg, Oral, Daily, Pavan Mckeon DO, 60 mg at 08/21/24 1124    sodium chloride flush 0.9 % injection 5-40 mL, 5-40 mL, IntraVENous, 2 times per day, Pavan Mckeon DO, 10 mL at 08/21/24 2038    sodium chloride flush 0.9 % injection 5-40 mL, 5-40 mL, IntraVENous, PRN, Pavan Mckeon DO    0.9 % sodium chloride infusion, , IntraVENous, PRN, Pavan Mckeon DO    potassium chloride (KLOR-CON M) extended release tablet 40 mEq, 40 mEq, Oral, PRN **OR** potassium bicarb-citric acid (EFFER-K) effervescent tablet 40 mEq, 40 mEq, Oral, PRN **OR** potassium chloride 10 mEq/100 mL IVPB (Peripheral Line), 10 mEq, IntraVENous, PRN, Pavan Mckeon DO    magnesium sulfate 2000 mg in 50 mL IVPB premix, 2,000 mg, IntraVENous, PRN, Pavan Mckeon DO    enoxaparin (LOVENOX) injection 40 mg, 40 mg, SubCUTAneous, Daily, Pavan Mckeon DO    polyethylene glycol (GLYCOLAX) packet 17 g, 17 g, Oral, Daily PRN, Pavan Mckeon DO    acetaminophen (TYLENOL) tablet 650 mg, 650 mg, Oral, Q6H PRN, 650 mg at 08/20/24 1747 **OR** acetaminophen (TYLENOL) suppository 650 mg, 650 mg, Rectal, Q6H PRN, Pavan Mckeon DO    amphetamine-dextroamphetamine (ADDERALL) tablet 30 mg, 30 mg, Oral, BID, Pavan Mckeon DO, 30 mg at 08/21/24 1746    sodium chloride flush 0.9 % injection 5-40 mL, 5-40 mL, IntraVENous, 2 times per day, Pavan Mckeon DO, 10 mL at 08/20/24 0848    sodium chloride  Problem:    Pelvic mass  Resolved Problems:    * No resolved hospital problems. *  S/p lung bx    Plan:  Dc home outpt f/u        Pavan Mckeon DO  7:04 AM  8/22/2024

## 2024-08-23 ENCOUNTER — TELEPHONE (OUTPATIENT)
Dept: PALLATIVE CARE | Age: 54
End: 2024-08-23

## 2024-08-23 ENCOUNTER — HOSPITAL ENCOUNTER (OUTPATIENT)
Dept: RADIATION ONCOLOGY | Age: 54
Discharge: HOME | End: 2024-08-23
Payer: COMMERCIAL

## 2024-08-23 PROCEDURE — 77336 RADIATION PHYSICS CONSULT: CPT | Performed by: RADIOLOGY

## 2024-08-23 PROCEDURE — 77412 RADIATION TX DELIVERY LVL 3: CPT | Performed by: RADIOLOGY

## 2024-08-23 RX ORDER — ONDANSETRON 4 MG/1
4 TABLET, FILM COATED ORAL 3 TIMES DAILY PRN
Qty: 30 TABLET | Refills: 0 | Status: SHIPPED | OUTPATIENT
Start: 2024-08-23

## 2024-08-23 NOTE — TELEPHONE ENCOUNTER
Call from Linh's sister Simin asking for Palliative Care to follow her sister. Linh was recently in patient at [facility] and was discharged yesterday. Simin would like her sister to meet with Palliative Care and states she knows Hospice is not far off, Linh is getting Palliative Brain radiation. Jayme set 8/27 after radiation.

## 2024-08-26 ENCOUNTER — APPOINTMENT (OUTPATIENT)
Dept: RADIATION ONCOLOGY | Age: 54
End: 2024-08-26
Payer: COMMERCIAL

## 2024-08-26 ENCOUNTER — OFFICE VISIT (OUTPATIENT)
Dept: PRIMARY CARE CLINIC | Age: 54
End: 2024-08-26
Payer: COMMERCIAL

## 2024-08-26 VITALS
SYSTOLIC BLOOD PRESSURE: 128 MMHG | BODY MASS INDEX: 32.78 KG/M2 | OXYGEN SATURATION: 96 % | HEART RATE: 96 BPM | WEIGHT: 222 LBS | DIASTOLIC BLOOD PRESSURE: 64 MMHG | TEMPERATURE: 97.3 F

## 2024-08-26 DIAGNOSIS — C79.31 METASTATIC CANCER TO BRAIN (HCC): Primary | ICD-10-CM

## 2024-08-26 DIAGNOSIS — R19.00 PELVIC MASS: ICD-10-CM

## 2024-08-26 DIAGNOSIS — C43.9 MALIGNANT MELANOMA, UNSPECIFIED SITE (HCC): ICD-10-CM

## 2024-08-26 LAB — SURGICAL PATHOLOGY REPORT: NORMAL

## 2024-08-26 PROCEDURE — 99213 OFFICE O/P EST LOW 20 MIN: CPT | Performed by: FAMILY MEDICINE

## 2024-08-26 PROCEDURE — 77412 RADIATION TX DELIVERY LVL 3: CPT | Performed by: RADIOLOGY

## 2024-08-26 ASSESSMENT — ENCOUNTER SYMPTOMS: ABDOMINAL PAIN: 0

## 2024-08-26 NOTE — PROGRESS NOTES
Linh Parrish, a female of 54 y.o. came to the office 8/26/2024.     Patient Active Problem List   Diagnosis    Brain mass    Metastasis to brain (HCC)    Pelvic mass          HPI onc: dealing with brain mets from her malignant melanoma. Is undergoing brain radiation. Also dealing with pelvic mass that was biopsied while in hosp on 8/21 and lung mass. Having speech difficulties with slurred. Knows what she wants to say but trouble saying it. Not taking Decadron.    No Known Allergies    Current Outpatient Medications on File Prior to Visit   Medication Sig Dispense Refill    ondansetron (ZOFRAN) 4 MG tablet Take 1 tablet by mouth 3 times daily as needed for Nausea or Vomiting 30 tablet 0    ondansetron (ZOFRAN-ODT) 4 MG disintegrating tablet Take 1 tablet by mouth every 8 hours as needed for Nausea or Vomiting 12 tablet 0    dexAMETHasone (DECADRON) 2 MG tablet Take 1 tablet by mouth 2 times daily (with meals) for 10 days 20 tablet 0    amLODIPine (NORVASC) 5 MG tablet Take 1 tablet by mouth daily 30 tablet 3    amphetamine-dextroamphetamine (ADDERALL, 30MG,) 30 MG tablet Take 1 tablet by mouth 2 times daily for 30 days. Max Daily Amount: 60 mg 60 tablet 0    Multiple Vitamin (MVI, CELEBRATE, CHEWABLE TABLET) Take 1 tablet by mouth daily      DULoxetine (CYMBALTA) 60 MG extended release capsule Take 1 capsule by mouth daily       No current facility-administered medications on file prior to visit.       Review of Systems   Constitutional:  Negative for appetite change, chills and fever.        Aunt Genny from [place] in [facility].    Eyes:  Positive for visual disturbance.   Gastrointestinal:  Negative for abdominal pain.   Genitourinary:  Negative for pelvic pain.   Neurological:  Negative for headaches.     other review of systems reviewed and are negative    OBJECTIVE:  /64   Pulse 96   Temp 97.3 °F (36.3 °C)   Wt 100.7 kg (222 lb)   SpO2 96%   BMI 32.78 kg/m²      Physical Exam  Vitals reviewed.   Eyes:

## 2024-08-27 ENCOUNTER — APPOINTMENT (OUTPATIENT)
Dept: RADIATION ONCOLOGY | Age: 54
End: 2024-08-27
Payer: COMMERCIAL

## 2024-08-27 ENCOUNTER — OFFICE VISIT (OUTPATIENT)
Dept: PALLATIVE CARE | Age: 54
End: 2024-08-27
Payer: COMMERCIAL

## 2024-08-27 VITALS
DIASTOLIC BLOOD PRESSURE: 96 MMHG | WEIGHT: 226 LBS | OXYGEN SATURATION: 97 % | HEART RATE: 76 BPM | TEMPERATURE: 97.3 F | BODY MASS INDEX: 33.37 KG/M2 | SYSTOLIC BLOOD PRESSURE: 141 MMHG

## 2024-08-27 DIAGNOSIS — C79.31 METASTASIS TO BRAIN (HCC): ICD-10-CM

## 2024-08-27 DIAGNOSIS — Z51.5 PALLIATIVE CARE BY SPECIALIST: Primary | ICD-10-CM

## 2024-08-27 DIAGNOSIS — G89.3 PAIN DUE TO NEOPLASM: ICD-10-CM

## 2024-08-27 DIAGNOSIS — C43.9 METASTATIC MELANOMA (HCC): ICD-10-CM

## 2024-08-27 PROCEDURE — 99202 OFFICE O/P NEW SF 15 MIN: CPT | Performed by: NURSE PRACTITIONER

## 2024-08-27 PROCEDURE — 99204 OFFICE O/P NEW MOD 45 MIN: CPT | Performed by: NURSE PRACTITIONER

## 2024-08-27 PROCEDURE — 77412 RADIATION TX DELIVERY LVL 3: CPT | Performed by: RADIOLOGY

## 2024-08-27 NOTE — PROGRESS NOTES
Palliative Care Department  Provider: TIGRE Gutierrez - CNP    Location of Service:   [facility]    Chief Complaint: Linh Parrish is a 54 y.o. female with chief complaint of pain, fatigue    Assessment/Plan      Metastatic melanoma:   -  originally diagnosed in 2021   -Status post wide local excision of the left shoulder with sentinel lymph node biopsy in 2021   -  Known to Dr. Nunez   -Metastasis to brain, lung, as well as adrenal metastasis noted August 2024   -Undergoing whole brain radiation therapy   -to start immunotherapy    Pain related to Neoplasm:   -Continue dexamethasone   -Tylenol as needed    Fatigue related neoplasm:   -Likely exacerbated by radiation   -Encourage activity as tolerated   -Continue dexamethasone    Follow Up:  4 weeks.  They were encouraged to call with any questions, concerns, needs, or changes in symptoms.    Subjective:     HPI:  Linh Parrish is a 54 y.o. female with metastatic melanoma.  She was originally diagnosed in 2021 with melanoma found on her left shoulder.  She underwent local wide excision and sentinel lymph node biopsy in 2021.  She is known to Dr. Nunez.  Unfortunately in August 2024 she was found to have widespread metastasis to her brain, lung, as well as adrenal glands.  She was referred to radiation oncology for whole brain radiation, and was referred to [facility] symptomatic management.    Subjective/Events/Discussions:  Yue presents today accompanied by her brother Jeremiah  She is able voice needs concerns well, has complaints today of pain, primarily headaches  She also has some nausea which is mild, utilizing Zofran for this  She additionally has some fatigue, but this is not severe or debilitating either  She otherwise does not have any other significant symptomatic complaints  Pain in her head is mild, has improved with dexamethasone, and she utilizes Tylenol at times  I do not recommend  inadvertent computerized transcription errors may be present.

## 2024-08-28 ENCOUNTER — APPOINTMENT (OUTPATIENT)
Dept: RADIATION ONCOLOGY | Age: 54
End: 2024-08-28
Payer: COMMERCIAL

## 2024-08-29 ENCOUNTER — APPOINTMENT (OUTPATIENT)
Dept: RADIATION ONCOLOGY | Age: 54
End: 2024-08-29
Payer: COMMERCIAL

## 2024-08-30 ENCOUNTER — APPOINTMENT (OUTPATIENT)
Dept: RADIATION ONCOLOGY | Age: 54
End: 2024-08-30
Payer: COMMERCIAL

## 2024-09-03 ENCOUNTER — TELEPHONE (OUTPATIENT)
Dept: PRIMARY CARE CLINIC | Age: 54
End: 2024-09-03

## 2024-09-03 DIAGNOSIS — C79.31 METASTATIC CANCER TO BRAIN (HCC): Primary | ICD-10-CM

## 2024-09-03 DIAGNOSIS — C43.9 MALIGNANT MELANOMA, UNSPECIFIED SITE (HCC): ICD-10-CM

## 2024-09-03 NOTE — TELEPHONE ENCOUNTER
Patients' POA, Bradley is asking for a referral for Linh for Hospice Services.  If you could do one thru Epic, Ghazal from Hospice can pull it.    Thank you

## 2024-09-04 DIAGNOSIS — C79.31 METASTATIC CANCER TO BRAIN (HCC): Primary | ICD-10-CM

## 2024-09-04 DIAGNOSIS — C43.9 MALIGNANT MELANOMA, UNSPECIFIED SITE (HCC): ICD-10-CM
